# Patient Record
Sex: FEMALE | Employment: FULL TIME | URBAN - METROPOLITAN AREA
[De-identification: names, ages, dates, MRNs, and addresses within clinical notes are randomized per-mention and may not be internally consistent; named-entity substitution may affect disease eponyms.]

---

## 2023-10-02 ENCOUNTER — TELEPHONE (OUTPATIENT)
Dept: OBSTETRICS AND GYNECOLOGY | Facility: CLINIC | Age: 48
End: 2023-10-02
Payer: COMMERCIAL

## 2023-10-02 NOTE — TELEPHONE ENCOUNTER
"Patient said she used the estradiol cream twice and her period came back would like to speak to a nurse to see if this is normal.    F/U call made to pt. She was seen 9/18/23. Pt LMP was approx Jan/Feb 2023. She said that she used her estradiol cream twice and a few days later she began to have \"period like bleeding\". Pt said that she has been using the estradiol cream with the applicator and inserting vaginally. Reviewed with pt that it could be agitating the vaginal lining and causing bleeding or that it is coincidental and she got a period and is not yet in menopause. Advised pt that the instructions for her use are to apply a pea sized amt to the vaginal opening 2x/w. Pt stated understanding and will monitor sx. She will CB PRN. And to schedule her urodynamic appt.   "

## 2023-10-20 PROBLEM — N95.2 VAGINAL ATROPHY: Status: ACTIVE | Noted: 2023-10-20

## 2023-10-20 PROBLEM — D50.9 IRON DEFICIENCY ANEMIA: Status: ACTIVE | Noted: 2023-10-20

## 2023-10-20 PROBLEM — N39.3 STRESS INCONTINENCE, FEMALE: Status: ACTIVE | Noted: 2023-10-20

## 2023-10-20 PROBLEM — Z86.19 HISTORY OF COLD SORES: Status: ACTIVE | Noted: 2023-10-20

## 2023-10-20 RX ORDER — MULTIVIT-MIN/IRON/FOLIC ACID/K 18-600-40
CAPSULE ORAL
COMMUNITY
End: 2023-10-23 | Stop reason: ALTCHOICE

## 2023-10-20 RX ORDER — VALACYCLOVIR HYDROCHLORIDE 1 G/1
TABLET, FILM COATED ORAL 2 TIMES DAILY
COMMUNITY
Start: 2022-03-17 | End: 2024-05-09 | Stop reason: DRUGHIGH

## 2023-10-20 RX ORDER — POLYETHYLENE GLYCOL 3350, SODIUM CHLORIDE, SODIUM BICARBONATE, POTASSIUM CHLORIDE 420; 11.2; 5.72; 1.48 G/4L; G/4L; G/4L; G/4L
POWDER, FOR SOLUTION ORAL
COMMUNITY
Start: 2022-01-31 | End: 2023-10-23 | Stop reason: ALTCHOICE

## 2023-10-20 RX ORDER — ASCORBIC ACID 250 MG
250 TABLET ORAL DAILY
COMMUNITY

## 2023-10-20 RX ORDER — ESTRADIOL 0.1 MG/G
2 CREAM VAGINAL EVERY OTHER DAY
COMMUNITY
Start: 2023-09-18

## 2023-10-20 RX ORDER — CYCLOSPORINE 0.5 MG/ML
EMULSION OPHTHALMIC
COMMUNITY
Start: 2020-11-13

## 2023-10-20 RX ORDER — FERROUS SULFATE 325(65) MG
1 TABLET ORAL DAILY
COMMUNITY
Start: 2021-11-05 | End: 2023-10-23 | Stop reason: ALTCHOICE

## 2023-10-20 RX ORDER — CHOLECALCIFEROL (VITAMIN D3) 125 MCG
3000 CAPSULE ORAL
COMMUNITY

## 2023-10-23 ENCOUNTER — OFFICE VISIT (OUTPATIENT)
Dept: OBSTETRICS AND GYNECOLOGY | Facility: CLINIC | Age: 48
End: 2023-10-23
Payer: COMMERCIAL

## 2023-10-23 ENCOUNTER — PREP FOR PROCEDURE (OUTPATIENT)
Dept: OBSTETRICS AND GYNECOLOGY | Facility: CLINIC | Age: 48
End: 2023-10-23

## 2023-10-23 VITALS
WEIGHT: 112 LBS | DIASTOLIC BLOOD PRESSURE: 74 MMHG | HEIGHT: 61 IN | BODY MASS INDEX: 21.14 KG/M2 | SYSTOLIC BLOOD PRESSURE: 114 MMHG

## 2023-10-23 DIAGNOSIS — N39.3 SUI (STRESS URINARY INCONTINENCE, FEMALE): Primary | ICD-10-CM

## 2023-10-23 DIAGNOSIS — N32.81 OAB (OVERACTIVE BLADDER): ICD-10-CM

## 2023-10-23 PROCEDURE — 1036F TOBACCO NON-USER: CPT | Performed by: STUDENT IN AN ORGANIZED HEALTH CARE EDUCATION/TRAINING PROGRAM

## 2023-10-23 PROCEDURE — 99214 OFFICE O/P EST MOD 30 MIN: CPT | Performed by: STUDENT IN AN ORGANIZED HEALTH CARE EDUCATION/TRAINING PROGRAM

## 2023-10-23 RX ORDER — SODIUM CHLORIDE, SODIUM LACTATE, POTASSIUM CHLORIDE, CALCIUM CHLORIDE 600; 310; 30; 20 MG/100ML; MG/100ML; MG/100ML; MG/100ML
100 INJECTION, SOLUTION INTRAVENOUS CONTINUOUS
Status: CANCELLED | OUTPATIENT
Start: 2023-12-28

## 2023-10-23 RX ORDER — PHENAZOPYRIDINE HYDROCHLORIDE 200 MG/1
200 TABLET, FILM COATED ORAL ONCE
Status: CANCELLED | OUTPATIENT
Start: 2023-10-23 | End: 2023-10-23

## 2023-10-23 ASSESSMENT — PAIN SCALES - GENERAL: PAINLEVEL: 0-NO PAIN

## 2023-10-23 NOTE — PROGRESS NOTES
HISTORY OF PRESENT ILLNESS:  Nancy Andres is a 47 y.o. female, who presents in follow up for CORTEZ, vaginal atrophy     During our last encounter on 9/18/23 we reviewed and agreed to the following:  - considering bulking and/or PFPT; needs UDS prior to surgery  - rec estrogen cream for vaginal atrophy (rx sent)  - OAB less bothersome, opting to focus on CORTEZ    Today she reports that she has CORTEZ episodes during physical activity. Pt states that she would like to go ahead with the UDS and midurethral sling for her CORTEZ.   Pt reports improvement in vaginal dryness and dyspareunia after using the estrogen cream twice.   Pt states her nocturnal OAB is less concerning, and she wakes up once per night due to symptoms.     IMPRESSION AND PLAN:  46 y/o with CORTEZ, OAB. Interested in surgical management.    CROTEZ  - Reviewed options for surgical management of CORTEZ: midurethral sling versus periurethral bulking injections and steps of each procedure  - Previously counseled on success rates of each: sling ~95% successful and bulkamid 60-70% success  - Interested in pursuing sling surgery  - Reviewed risk of urinary retention requiring indwelling catheter temporarily which would be removed in the office following surgery  - reviewed recovery expectations: 2 weeks no heavy lifting, no driving for first few days and how to know she is ok to resume driving  - Will need urodynamics prior to surgery? Yes, scheduling prior to leaving the office today  - Does not need PCP/PAT clearance prior to OR  - surgery consent signed today in office and OR reservation request made today  - motivated to have surgery before end of the year, discussed my maternity leave in 2024  - ok with [place] or [facility]    OAB  - not bothersome  - deferring management at this time    All questions and concerns were answered and addressed.  The patient expressed understanding and agrees with the plan.     Leonora Mena MD

## 2023-10-23 NOTE — PROGRESS NOTES
"HISTORY OF PRESENT ILLNESS:  Nancy Andres is a 47 y.o. female, who presents in follow up for CORTEZ, vaginal atrophy     During our last encounter on 9/18/23 we reviewed and agreed to the following:  - considering bulking and/or PFPT; needs UDS prior to surgery  - rec estrogen cream for vaginal atrophy (rx sent)  - OAB less bothersome, opting to focus on CORTEZ    Today she reports   ***    The following were reviewed to gain additional history:  External notes: ***  Test results: ***          PHYSICAL EXAMINATION:  Patient's last menstrual period was 09/18/2023.  Body mass index is 21.16 kg/m².  /74   Ht 1.549 m (5' 1\")   Wt 50.8 kg (112 lb)   LMP 09/18/2023   BMI 21.16 kg/m²     General Appearance: well appearing  Neuro: Alert and oriented   HEENT: mucous membranes moist, neck supple  Resp: No respiratory distress, normal work of breathing  MSK: normal range of motion, gait appropriate    Pelvic:  Chaperone for pelvic exam:   Genitourinary: *** normal external genitalia, Bartholin's glands, Brook[place]'s glands negative,   Urethra ***normal meatus, non-tender, no periurethral mass  Vaginal mucosa *** normal  Cervix *** normal  Uterus ***normal size, nontender  Adnexae *** negative nontender, no masses  Atrophy {POSITIVE/NEGATIVE:18270}    CST {POSITIVE/NEGATIVE:20240}  Pelvic floor muscle contraction  ***/5    POP-Q (in supine position):       Aa ***     Ba ***     C ***              gh ***     pb ***     tvl ***              Ap ***     Bp ***     D ***    Rectal: no hemorrhoids, fissures or masses.    PVR (by ultrasound): ***  Urine dip:  No results found for this or any previous visit.       IMPRESSION AND PLAN:  ***    All questions and concerns were answered and addressed.  The patient expressed understanding and agrees with the plan.     "

## 2023-10-25 PROBLEM — N39.3 SUI (STRESS URINARY INCONTINENCE, FEMALE): Status: ACTIVE | Noted: 2023-10-23

## 2023-10-26 ENCOUNTER — TELEPHONE (OUTPATIENT)
Dept: OBSTETRICS AND GYNECOLOGY | Facility: CLINIC | Age: 48
End: 2023-10-26
Payer: COMMERCIAL

## 2023-10-26 NOTE — TELEPHONE ENCOUNTER
I spoke with patient on 10/25/23 to schedule her surgery with Dr Mena at [facility]. She is now scheduled for 12/28/2023 for a mid-urethral sling and cystoscopy.

## 2023-10-30 ENCOUNTER — PROCEDURE VISIT (OUTPATIENT)
Dept: OBSTETRICS AND GYNECOLOGY | Facility: CLINIC | Age: 48
End: 2023-10-30
Payer: COMMERCIAL

## 2023-10-30 DIAGNOSIS — N39.3 SUI (STRESS URINARY INCONTINENCE, FEMALE): Primary | ICD-10-CM

## 2023-10-30 PROCEDURE — 51741 ELECTRO-UROFLOWMETRY FIRST: CPT | Performed by: STUDENT IN AN ORGANIZED HEALTH CARE EDUCATION/TRAINING PROGRAM

## 2023-10-30 PROCEDURE — 51797 INTRAABDOMINAL PRESSURE TEST: CPT | Performed by: STUDENT IN AN ORGANIZED HEALTH CARE EDUCATION/TRAINING PROGRAM

## 2023-10-30 PROCEDURE — 51729 CYSTOMETROGRAM W/VP&UP: CPT | Performed by: STUDENT IN AN ORGANIZED HEALTH CARE EDUCATION/TRAINING PROGRAM

## 2023-10-30 PROCEDURE — 51784 ANAL/URINARY MUSCLE STUDY: CPT | Performed by: STUDENT IN AN ORGANIZED HEALTH CARE EDUCATION/TRAINING PROGRAM

## 2023-10-31 ENCOUNTER — TELEMEDICINE (OUTPATIENT)
Dept: OBSTETRICS AND GYNECOLOGY | Facility: CLINIC | Age: 48
End: 2023-10-31
Payer: COMMERCIAL

## 2023-10-31 VITALS — WEIGHT: 112 LBS | BODY MASS INDEX: 21.99 KG/M2 | HEIGHT: 60 IN

## 2023-10-31 DIAGNOSIS — N39.3 SUI (STRESS URINARY INCONTINENCE, FEMALE): Primary | ICD-10-CM

## 2023-10-31 PROCEDURE — 51729 CYSTOMETROGRAM W/VP&UP: CPT | Performed by: STUDENT IN AN ORGANIZED HEALTH CARE EDUCATION/TRAINING PROGRAM

## 2023-10-31 PROCEDURE — 51784 ANAL/URINARY MUSCLE STUDY: CPT | Performed by: STUDENT IN AN ORGANIZED HEALTH CARE EDUCATION/TRAINING PROGRAM

## 2023-10-31 PROCEDURE — 51798 US URINE CAPACITY MEASURE: CPT | Performed by: STUDENT IN AN ORGANIZED HEALTH CARE EDUCATION/TRAINING PROGRAM

## 2023-10-31 PROCEDURE — 51741 ELECTRO-UROFLOWMETRY FIRST: CPT | Performed by: STUDENT IN AN ORGANIZED HEALTH CARE EDUCATION/TRAINING PROGRAM

## 2023-10-31 PROCEDURE — 99213 OFFICE O/P EST LOW 20 MIN: CPT | Performed by: STUDENT IN AN ORGANIZED HEALTH CARE EDUCATION/TRAINING PROGRAM

## 2023-10-31 PROCEDURE — 51797 INTRAABDOMINAL PRESSURE TEST: CPT | Performed by: STUDENT IN AN ORGANIZED HEALTH CARE EDUCATION/TRAINING PROGRAM

## 2023-10-31 ASSESSMENT — PAIN SCALES - GENERAL: PAINLEVEL: 0-NO PAIN

## 2023-10-31 NOTE — PROGRESS NOTES
Patient ID: Nnacy Andres is a 47 y.o. female.    Uroflowmetry    Date/Time: 10/31/2023 9:15 AM    Performed by: Sana Stanley MA  Authorized by: Leonora Mena MD    Procedure Details     Procedure: CMG with UPP/voiding pressures, EMG, intra-abdominal voiding study and uroflow      CMG with UPP/Voiding Pressures Details:     First urge to void (mL): 195    Urgency to void (mL): 241    Bladder capacity (mL): 301    Intra-abdominal Voiding Study Details:     Rectal catheter placed to record intra-abdominal pressure: yes      Uroflow Details:     Pre-void volume (mL): 52.7    Voiding duration (sec): 9.4    Average flow rate (mL/sec): 5.6    Max flow rate (mL/sec): 8.8    Type of curve: bell      Voided urine (mL): 329    Residual urine (mL): 0    Post-Procedure Details     Outcome: patient tolerated procedure well with no complications      Additional Details      + stress incontinence

## 2023-10-31 NOTE — PROGRESS NOTES
Telemedicine Visit - Urogynecology    A telephone visit (audio only) between the patient (at the originating site) and provider (at the distant site) was utilized to provide this telehealth service  Verbal consent was obtained from Nancy Andres on this date 10/31/23 for a telehealth visit    HISTORY OF PRESENT ILLNESS:  Nancy Andres is a 47 y.o. female, here today for a virtual visit in follow up for UDS results     During our last encounter on 10/23/23 we reviewed and agreed to the followin y/o with CORTEZ, OAB. Interested in surgical management.     CORTEZ  - Reviewed options for surgical management of CORTEZ: midurethral sling versus periurethral bulking injections and steps of each procedure  - Previously counseled on success rates of each: sling ~95% successful and bulkamid 60-70% success  - Interested in pursuing sling surgery  - Reviewed risk of urinary retention requiring indwelling catheter temporarily which would be removed in the office following surgery  - reviewed recovery expectations: 2 weeks no heavy lifting, no driving for first few days and how to know she is ok to resume driving  - Will need urodynamics prior to surgery? Yes, scheduling prior to leaving the office today  - Does not need PCP/PAT clearance prior to OR  - surgery consent signed today in office and OR reservation request made today  - motivated to have surgery before end of the year, discussed my maternity leave in   - ok with [place] or [facility]    OAB  - not bothersome  - deferring management at this time     All questions and concerns were answered and addressed.  The patient expressed understanding and agrees with the plan.    Today she reports   Ready for surgery, scheduled for . Asking about post op appointments and duration of surgery.    The following were reviewed to gain additional history:  Test results: UDS reviewed (see read interpretation from this encounter)      IMPRESSION AND PLAN:  48 y/o with CORTEZ,  virtual visit to review UDS results    CORTEZ  - confirmed on UDS  - appropriate candidate for sling as planned  - will plan to schedule post op appointment with me for 2 weeks post op, Shena Canales CNP will be back up plan. She is amenable to this plan.  - all questions answered    Follow up for surgery 12/28/23 as scheduled    All questions and concerns were answered and addressed.  The patient expressed understanding and agrees with the plan.     Leonora Mena MD

## 2023-10-31 NOTE — PROGRESS NOTES
Urodynamics Results    Uroflowmetry:   Maximum Flow Rate: 8.8 ml/s   Average Flow: 5.6 ml/s   Volume Voided: 52.7 ml   Post void residual: 5 ml    Cystometry:   First desire: 195 ml   P detrusor: -3 cm H2O   Strong desire: 239  ml   P detrusor: -6 cm H2O  Bladder Capacity: 301 ml.   End filling detrusor pressure: -5 H2O   Bladder sensation: normal    Detrusor activity: negative DO    Compliance: normal  CORTEZ: cough leak at 241  Urethral pressure profile (UPP): fair  Maximum urethral closure pressure: 59 H2O    Voiding Study:   Maximum flow: 23.1 ml/s   Average flow: 13.4 ml/s   P detrusor at peak flow: 43.6 cm H2O (likely falsely elevated, Pabd cath to zero)  Volume voided: 329.3 ml   Pattern:  bell curve   Mechanism of voiding: detrusor contraction  Detrusor contraction with void: good    UDS INTERPRETATION    Uroflow: low voided volume, appropriate flow pattern, PVR 5 ml    CMG: normal compliance, normal sensation, negative DO, + CORTEZ at 240 ml    Voiding: normal flow pattern, normal detrusor pressure, normal voiding mechanism    SUMMARY:  +CORTEZ, otherwise unremarkable UDS  Appropriate candidate for sling as scheduled 12/28/23    Leonora Mena MD

## 2023-12-28 ENCOUNTER — ANESTHESIA EVENT (OUTPATIENT)
Dept: OPERATING ROOM | Facility: HOSPITAL | Age: 48
End: 2023-12-28
Payer: COMMERCIAL

## 2023-12-28 ENCOUNTER — HOSPITAL ENCOUNTER (OUTPATIENT)
Facility: HOSPITAL | Age: 48
Setting detail: OUTPATIENT SURGERY
Discharge: HOME | End: 2023-12-28
Attending: STUDENT IN AN ORGANIZED HEALTH CARE EDUCATION/TRAINING PROGRAM | Admitting: STUDENT IN AN ORGANIZED HEALTH CARE EDUCATION/TRAINING PROGRAM
Payer: COMMERCIAL

## 2023-12-28 ENCOUNTER — ANESTHESIA (OUTPATIENT)
Dept: OPERATING ROOM | Facility: HOSPITAL | Age: 48
End: 2023-12-28
Payer: COMMERCIAL

## 2023-12-28 VITALS
BODY MASS INDEX: 21.6 KG/M2 | HEIGHT: 60 IN | WEIGHT: 110 LBS | RESPIRATION RATE: 18 BRPM | TEMPERATURE: 98.6 F | DIASTOLIC BLOOD PRESSURE: 61 MMHG | SYSTOLIC BLOOD PRESSURE: 119 MMHG | OXYGEN SATURATION: 100 % | HEART RATE: 71 BPM

## 2023-12-28 DIAGNOSIS — N39.3 SUI (STRESS URINARY INCONTINENCE, FEMALE): Primary | ICD-10-CM

## 2023-12-28 LAB — HCG UR QL IA.RAPID: NEGATIVE

## 2023-12-28 PROCEDURE — 2500000004 HC RX 250 GENERAL PHARMACY W/ HCPCS (ALT 636 FOR OP/ED): Performed by: STUDENT IN AN ORGANIZED HEALTH CARE EDUCATION/TRAINING PROGRAM

## 2023-12-28 PROCEDURE — 2500000005 HC RX 250 GENERAL PHARMACY W/O HCPCS: Performed by: NURSE ANESTHETIST, CERTIFIED REGISTERED

## 2023-12-28 PROCEDURE — 81025 URINE PREGNANCY TEST: CPT | Performed by: STUDENT IN AN ORGANIZED HEALTH CARE EDUCATION/TRAINING PROGRAM

## 2023-12-28 PROCEDURE — 2780000003 HC OR 278 NO HCPCS: Performed by: STUDENT IN AN ORGANIZED HEALTH CARE EDUCATION/TRAINING PROGRAM

## 2023-12-28 PROCEDURE — A57288 PR SLING OPER STRES INCONTINENCE: Performed by: NURSE ANESTHETIST, CERTIFIED REGISTERED

## 2023-12-28 PROCEDURE — 3700000001 HC GENERAL ANESTHESIA TIME - INITIAL BASE CHARGE: Performed by: STUDENT IN AN ORGANIZED HEALTH CARE EDUCATION/TRAINING PROGRAM

## 2023-12-28 PROCEDURE — A57288 PR SLING OPER STRES INCONTINENCE: Performed by: STUDENT IN AN ORGANIZED HEALTH CARE EDUCATION/TRAINING PROGRAM

## 2023-12-28 PROCEDURE — 3600000008 HC OR TIME - EACH INCREMENTAL 1 MINUTE - PROCEDURE LEVEL THREE: Performed by: STUDENT IN AN ORGANIZED HEALTH CARE EDUCATION/TRAINING PROGRAM

## 2023-12-28 PROCEDURE — 2500000001 HC RX 250 WO HCPCS SELF ADMINISTERED DRUGS (ALT 637 FOR MEDICARE OP): Performed by: STUDENT IN AN ORGANIZED HEALTH CARE EDUCATION/TRAINING PROGRAM

## 2023-12-28 PROCEDURE — C1771 REP DEV, URINARY, W/SLING: HCPCS | Performed by: STUDENT IN AN ORGANIZED HEALTH CARE EDUCATION/TRAINING PROGRAM

## 2023-12-28 PROCEDURE — 7100000010 HC PHASE TWO TIME - EACH INCREMENTAL 1 MINUTE: Performed by: STUDENT IN AN ORGANIZED HEALTH CARE EDUCATION/TRAINING PROGRAM

## 2023-12-28 PROCEDURE — 57288 REPAIR BLADDER DEFECT: CPT | Performed by: STUDENT IN AN ORGANIZED HEALTH CARE EDUCATION/TRAINING PROGRAM

## 2023-12-28 PROCEDURE — 7100000001 HC RECOVERY ROOM TIME - INITIAL BASE CHARGE: Performed by: STUDENT IN AN ORGANIZED HEALTH CARE EDUCATION/TRAINING PROGRAM

## 2023-12-28 PROCEDURE — 3700000002 HC GENERAL ANESTHESIA TIME - EACH INCREMENTAL 1 MINUTE: Performed by: STUDENT IN AN ORGANIZED HEALTH CARE EDUCATION/TRAINING PROGRAM

## 2023-12-28 PROCEDURE — 7100000009 HC PHASE TWO TIME - INITIAL BASE CHARGE: Performed by: STUDENT IN AN ORGANIZED HEALTH CARE EDUCATION/TRAINING PROGRAM

## 2023-12-28 PROCEDURE — 7100000002 HC RECOVERY ROOM TIME - EACH INCREMENTAL 1 MINUTE: Performed by: STUDENT IN AN ORGANIZED HEALTH CARE EDUCATION/TRAINING PROGRAM

## 2023-12-28 PROCEDURE — 2500000004 HC RX 250 GENERAL PHARMACY W/ HCPCS (ALT 636 FOR OP/ED): Performed by: NURSE ANESTHETIST, CERTIFIED REGISTERED

## 2023-12-28 PROCEDURE — 3600000003 HC OR TIME - INITIAL BASE CHARGE - PROCEDURE LEVEL THREE: Performed by: STUDENT IN AN ORGANIZED HEALTH CARE EDUCATION/TRAINING PROGRAM

## 2023-12-28 RX ORDER — DEXAMETHASONE SODIUM PHOSPHATE 4 MG/ML
INJECTION, SOLUTION INTRA-ARTICULAR; INTRALESIONAL; INTRAMUSCULAR; INTRAVENOUS; SOFT TISSUE AS NEEDED
Status: DISCONTINUED | OUTPATIENT
Start: 2023-12-28 | End: 2023-12-28

## 2023-12-28 RX ORDER — OXYCODONE HYDROCHLORIDE 5 MG/1
5 TABLET ORAL EVERY 6 HOURS PRN
Qty: 12 TABLET | Refills: 0 | Status: SHIPPED | OUTPATIENT
Start: 2023-12-28 | End: 2023-12-31

## 2023-12-28 RX ORDER — OXYCODONE HYDROCHLORIDE 5 MG/1
5 TABLET ORAL EVERY 4 HOURS PRN
Status: DISCONTINUED | OUTPATIENT
Start: 2023-12-28 | End: 2023-12-28 | Stop reason: HOSPADM

## 2023-12-28 RX ORDER — PHENAZOPYRIDINE HYDROCHLORIDE 100 MG/1
200 TABLET, FILM COATED ORAL ONCE
Status: COMPLETED | OUTPATIENT
Start: 2023-12-28 | End: 2023-12-28

## 2023-12-28 RX ORDER — KETOROLAC TROMETHAMINE 30 MG/ML
INJECTION, SOLUTION INTRAMUSCULAR; INTRAVENOUS AS NEEDED
Status: DISCONTINUED | OUTPATIENT
Start: 2023-12-28 | End: 2023-12-28

## 2023-12-28 RX ORDER — LABETALOL HYDROCHLORIDE 5 MG/ML
5 INJECTION, SOLUTION INTRAVENOUS ONCE AS NEEDED
Status: DISCONTINUED | OUTPATIENT
Start: 2023-12-28 | End: 2023-12-28 | Stop reason: HOSPADM

## 2023-12-28 RX ORDER — LIDOCAINE HYDROCHLORIDE 20 MG/ML
INJECTION, SOLUTION EPIDURAL; INFILTRATION; INTRACAUDAL; PERINEURAL AS NEEDED
Status: DISCONTINUED | OUTPATIENT
Start: 2023-12-28 | End: 2023-12-28

## 2023-12-28 RX ORDER — POLYETHYLENE GLYCOL 3350 17 G/17G
17 POWDER, FOR SOLUTION ORAL DAILY
Qty: 510 G | Refills: 0 | Status: SHIPPED | OUTPATIENT
Start: 2023-12-28 | End: 2024-01-27

## 2023-12-28 RX ORDER — LIDOCAINE HYDROCHLORIDE 10 MG/ML
0.1 INJECTION, SOLUTION EPIDURAL; INFILTRATION; INTRACAUDAL; PERINEURAL ONCE
Status: DISCONTINUED | OUTPATIENT
Start: 2023-12-28 | End: 2023-12-28 | Stop reason: HOSPADM

## 2023-12-28 RX ORDER — MIDAZOLAM HYDROCHLORIDE 1 MG/ML
INJECTION, SOLUTION INTRAMUSCULAR; INTRAVENOUS AS NEEDED
Status: DISCONTINUED | OUTPATIENT
Start: 2023-12-28 | End: 2023-12-28

## 2023-12-28 RX ORDER — ACETAMINOPHEN 325 MG/1
975 TABLET ORAL ONCE
Status: COMPLETED | OUTPATIENT
Start: 2023-12-28 | End: 2023-12-28

## 2023-12-28 RX ORDER — ROCURONIUM BROMIDE 50 MG/5 ML
SYRINGE (ML) INTRAVENOUS AS NEEDED
Status: DISCONTINUED | OUTPATIENT
Start: 2023-12-28 | End: 2023-12-28

## 2023-12-28 RX ORDER — SODIUM CHLORIDE, SODIUM LACTATE, POTASSIUM CHLORIDE, CALCIUM CHLORIDE 600; 310; 30; 20 MG/100ML; MG/100ML; MG/100ML; MG/100ML
100 INJECTION, SOLUTION INTRAVENOUS CONTINUOUS
Status: DISCONTINUED | OUTPATIENT
Start: 2023-12-28 | End: 2023-12-28 | Stop reason: HOSPADM

## 2023-12-28 RX ORDER — FENTANYL CITRATE 50 UG/ML
INJECTION, SOLUTION INTRAMUSCULAR; INTRAVENOUS AS NEEDED
Status: DISCONTINUED | OUTPATIENT
Start: 2023-12-28 | End: 2023-12-28

## 2023-12-28 RX ORDER — CEFAZOLIN SODIUM 2 G/100ML
2 INJECTION, SOLUTION INTRAVENOUS ONCE
Status: COMPLETED | OUTPATIENT
Start: 2023-12-28 | End: 2023-12-28

## 2023-12-28 RX ORDER — FENTANYL CITRATE 50 UG/ML
25 INJECTION, SOLUTION INTRAMUSCULAR; INTRAVENOUS EVERY 5 MIN PRN
Status: DISCONTINUED | OUTPATIENT
Start: 2023-12-28 | End: 2023-12-28 | Stop reason: HOSPADM

## 2023-12-28 RX ORDER — HYDROMORPHONE HYDROCHLORIDE 1 MG/ML
0.5 INJECTION, SOLUTION INTRAMUSCULAR; INTRAVENOUS; SUBCUTANEOUS EVERY 5 MIN PRN
Status: DISCONTINUED | OUTPATIENT
Start: 2023-12-28 | End: 2023-12-28 | Stop reason: HOSPADM

## 2023-12-28 RX ORDER — PROPOFOL 10 MG/ML
INJECTION, EMULSION INTRAVENOUS AS NEEDED
Status: DISCONTINUED | OUTPATIENT
Start: 2023-12-28 | End: 2023-12-28

## 2023-12-28 RX ORDER — ALBUTEROL SULFATE 0.83 MG/ML
2.5 SOLUTION RESPIRATORY (INHALATION) ONCE AS NEEDED
Status: DISCONTINUED | OUTPATIENT
Start: 2023-12-28 | End: 2023-12-28 | Stop reason: HOSPADM

## 2023-12-28 RX ORDER — ONDANSETRON HYDROCHLORIDE 2 MG/ML
INJECTION, SOLUTION INTRAVENOUS AS NEEDED
Status: DISCONTINUED | OUTPATIENT
Start: 2023-12-28 | End: 2023-12-28

## 2023-12-28 RX ORDER — VASOPRESSIN 20 U/ML
INJECTION PARENTERAL CONTINUOUS PRN
Status: COMPLETED | OUTPATIENT
Start: 2023-12-28 | End: 2023-12-28

## 2023-12-28 RX ORDER — ONDANSETRON HYDROCHLORIDE 2 MG/ML
4 INJECTION, SOLUTION INTRAVENOUS ONCE AS NEEDED
Status: DISCONTINUED | OUTPATIENT
Start: 2023-12-28 | End: 2023-12-28 | Stop reason: HOSPADM

## 2023-12-28 RX ORDER — MEPERIDINE HYDROCHLORIDE 50 MG/ML
12.5 INJECTION INTRAMUSCULAR; INTRAVENOUS; SUBCUTANEOUS EVERY 10 MIN PRN
Status: DISCONTINUED | OUTPATIENT
Start: 2023-12-28 | End: 2023-12-28 | Stop reason: HOSPADM

## 2023-12-28 RX ORDER — CELECOXIB 200 MG/1
200 CAPSULE ORAL ONCE
Status: COMPLETED | OUTPATIENT
Start: 2023-12-28 | End: 2023-12-28

## 2023-12-28 RX ADMIN — FENTANYL CITRATE 25 MCG: 50 INJECTION, SOLUTION INTRAMUSCULAR; INTRAVENOUS at 08:05

## 2023-12-28 RX ADMIN — PHENAZOPYRIDINE 200 MG: 100 TABLET ORAL at 07:23

## 2023-12-28 RX ADMIN — ONDANSETRON 4 MG: 2 INJECTION, SOLUTION INTRAMUSCULAR; INTRAVENOUS at 08:42

## 2023-12-28 RX ADMIN — SUGAMMADEX 200 MG: 100 INJECTION, SOLUTION INTRAVENOUS at 09:01

## 2023-12-28 RX ADMIN — CELECOXIB 200 MG: 200 CAPSULE ORAL at 07:24

## 2023-12-28 RX ADMIN — FENTANYL CITRATE 25 MCG: 50 INJECTION, SOLUTION INTRAMUSCULAR; INTRAVENOUS at 09:37

## 2023-12-28 RX ADMIN — DEXAMETHASONE SODIUM PHOSPHATE 4 MG: 4 INJECTION INTRA-ARTICULAR; INTRALESIONAL; INTRAMUSCULAR; INTRAVENOUS; SOFT TISSUE at 07:55

## 2023-12-28 RX ADMIN — Medication 50 MG: at 07:45

## 2023-12-28 RX ADMIN — MIDAZOLAM 2 MG: 1 INJECTION INTRAMUSCULAR; INTRAVENOUS at 07:38

## 2023-12-28 RX ADMIN — PROPOFOL 200 MG: 10 INJECTION, EMULSION INTRAVENOUS at 07:45

## 2023-12-28 RX ADMIN — FENTANYL CITRATE 50 MCG: 50 INJECTION, SOLUTION INTRAMUSCULAR; INTRAVENOUS at 07:45

## 2023-12-28 RX ADMIN — ACETAMINOPHEN 975 MG: 325 TABLET ORAL at 07:23

## 2023-12-28 RX ADMIN — SODIUM CHLORIDE, SODIUM LACTATE, POTASSIUM CHLORIDE, AND CALCIUM CHLORIDE: 600; 310; 30; 20 INJECTION, SOLUTION INTRAVENOUS at 07:38

## 2023-12-28 RX ADMIN — CEFAZOLIN SODIUM 2 G: 2 INJECTION, SOLUTION INTRAVENOUS at 07:51

## 2023-12-28 RX ADMIN — KETOROLAC TROMETHAMINE 30 MG: 30 INJECTION, SOLUTION INTRAMUSCULAR; INTRAVENOUS at 08:57

## 2023-12-28 RX ADMIN — FENTANYL CITRATE 25 MCG: 50 INJECTION, SOLUTION INTRAMUSCULAR; INTRAVENOUS at 08:42

## 2023-12-28 RX ADMIN — LIDOCAINE HYDROCHLORIDE 100 MG: 20 INJECTION, SOLUTION EPIDURAL; INFILTRATION; INTRACAUDAL; PERINEURAL at 07:45

## 2023-12-28 SDOH — HEALTH STABILITY: MENTAL HEALTH: CURRENT SMOKER: 0

## 2023-12-28 ASSESSMENT — PAIN - FUNCTIONAL ASSESSMENT
PAIN_FUNCTIONAL_ASSESSMENT: 0-10

## 2023-12-28 ASSESSMENT — COLUMBIA-SUICIDE SEVERITY RATING SCALE - C-SSRS
6. HAVE YOU EVER DONE ANYTHING, STARTED TO DO ANYTHING, OR PREPARED TO DO ANYTHING TO END YOUR LIFE?: NO
2. HAVE YOU ACTUALLY HAD ANY THOUGHTS OF KILLING YOURSELF?: NO
1. IN THE PAST MONTH, HAVE YOU WISHED YOU WERE DEAD OR WISHED YOU COULD GO TO SLEEP AND NOT WAKE UP?: NO

## 2023-12-28 ASSESSMENT — PAIN SCALES - GENERAL
PAINLEVEL_OUTOF10: 1
PAINLEVEL_OUTOF10: 4
PAINLEVEL_OUTOF10: 2
PAIN_LEVEL: 0
PAINLEVEL_OUTOF10: 0 - NO PAIN
PAINLEVEL_OUTOF10: 2

## 2023-12-28 ASSESSMENT — PAIN DESCRIPTION - DESCRIPTORS
DESCRIPTORS: ACHING;DULL;SORE
DESCRIPTORS: ACHING;SHARP;SORE

## 2023-12-28 NOTE — DISCHARGE INSTRUCTIONS
Call Dr. Mena's office for any problems and/or concerns    *Walk as much as possible, it is ok to use stairs carefully  *Ok to shower, avoid soaking in tub baths or swimming for 4-6 weeks after surgery   *Continue the coughing and deep breathing exercises that your learned in the hospital  *No lifting/straining and avoid constipation for 4-6 weeks (Avoid strenuous activity)  *Prevent constipation by using stool softeners and staying hydrated, so that you do not strain against your stitches or have pain from constipation  *Vaginal rest for 6 weeks (Do not put anything in your vagina except prescribed vaginal estrogen. Do not use tampons or douches. Do not have sex until cleared by physician)    Call Doctor right away for:    *Fever above 100.4/shaking chills  *Bright red vaginal bleeding or bleeding that soaks more than one sanitary pad per hour  *A foul smelling discharge from the vagina  *Trouble urinating or burning with urination  *Severe pain or bloating in your abdomen  *Persistent nausea/vomiting  *Redness, swelling, or drainage at your incision sites  *Chest pain/shortness of breath-call 911.    - You will be going home with prescriptions for pain medication. If you are able to take them, alternate a dose of Acetaminophen (Tylenol) and Ibuprofen (Motrin) every 3 hours. (For example, 1000mg of Tylenol at 09:00am, 600mg ibuprofen at 12:00pm, 1000mg of Tylenol at 3:00pm, 600mg ibuprofen at 6:00pm).   - Use any prescribed narcotic (ie oxycodone) for breakthrough pain as prescribed.   - Use a stool softener, such as Miralax, to prevent constipation from the narcotic medication.   - If you are going home with or already have a prescription for estrogen cream, you may begin/resume use vaginally as prescribed nightly until your 2 week post-op visit.

## 2023-12-28 NOTE — H&P
History Of Present Illness  Nancy Andres is a 48 y.o. female presenting with stress urinary  incontinence.     Past Medical History  No past medical history on file.    Surgical History  Past Surgical History:   Procedure Laterality Date    IR ANGIOGRAM INFERIOR EPIGASTRIC  2020    IR ANGIOGRAM INFERIOR EPIGASTRIC 2020 [facility]    IR ANGIOGRAM INFERIOR EPIGASTRIC  2020    IR ANGIOGRAM INFERIOR EPIGASTRIC 2020 [facility]    IR ANGIOGRAM INFERIOR EPIGASTRIC PELVIC  2020    IR ANGIOGRAM INFERIOR EPIGASTRIC PELVIC 2020 [facility]    OTHER SURGICAL HISTORY  2021    Uterine nerve ablation    OTHER SURGICAL HISTORY  2021     section    OTHER SURGICAL HISTORY  2021    Corneal lasik        Social History  She reports that she has never smoked. She has never used smokeless tobacco. She reports that she does not drink alcohol and does not use drugs.    Family History  Family History   Problem Relation Name Age of Onset    Breast cancer Mother      Other (thyroid dysfunction) Mother      Hypertension Father          Allergies  Gadoterate meglumine and Iodinated contrast media    Review of Systems   Genitourinary:         Urinary incontinence   All other systems reviewed and are negative.       Physical Exam  Vitals reviewed.   Constitutional:       Appearance: Normal appearance.   HENT:      Head: Normocephalic and atraumatic.   Cardiovascular:      Rate and Rhythm: Normal rate.   Pulmonary:      Effort: Pulmonary effort is normal.   Abdominal:      General: Abdomen is flat.      Palpations: Abdomen is soft.   Musculoskeletal:      Right lower leg: No edema.      Left lower leg: No edema.   Skin:     General: Skin is warm and dry.   Neurological:      Mental Status: She is alert.          Last Recorded Vitals  There were no vitals taken for this visit.       Assessment/Plan   Principal Problem:    CORTEZ (stress urinary incontinence,  female)      Proceed with scheduled MUS  Informed consent performed in office  Discussed post op resterictions and medications       Dacia Cain MD  URPS Fellow

## 2023-12-28 NOTE — ANESTHESIA POSTPROCEDURE EVALUATION
Patient: Nancy Andres    Procedure Summary       Date: 12/28/23 Room / Location: [facility] OR 04 / Virtual [facility] OR    Anesthesia Start: 0738 Anesthesia Stop: 0915    Procedures:       Suspension Urethra with Retropubic Sling      Cystoscopy Rigid Diagnosis:       CORTEZ (stress urinary incontinence, female)      (CORTEZ (stress urinary incontinence, female) [N39.3])    Surgeons: Leonora Mena MD Responsible Provider: Tony Ruby DO    Anesthesia Type: general ASA Status: 2            Anesthesia Type: general    Vitals Value Taken Time   /72 12/28/23 1015   Temp 36.1 °C (97 °F) 12/28/23 1015   Pulse 70 12/28/23 1017   Resp 16 12/28/23 1017   SpO2 97 % 12/28/23 1017   Vitals shown include unvalidated device data.    Anesthesia Post Evaluation    Patient location during evaluation: PACU  Patient participation: complete - patient participated  Level of consciousness: awake  Pain score: 0  Pain management: adequate  Multimodal analgesia pain management approach  Airway patency: patent  Two or more strategies used to mitigate risk of obstructive sleep apnea  Cardiovascular status: acceptable  Respiratory status: acceptable  Hydration status: acceptable  Postoperative Nausea and Vomiting: none        There were no known notable events for this encounter.

## 2023-12-28 NOTE — ANESTHESIA PREPROCEDURE EVALUATION
Patient: Nancy Andres    Procedure Information       Date/Time: 23 0730    Procedures:       Suspension Urethra with Retropubic Sling      Cystoscopy Rigid    Location: [facility] OR  [facility] OR    Surgeons: Leonora Mena MD          Vitals:    23 0703   BP: 133/86   Pulse: 64   Resp: 18   Temp: 36.4 °C (97.5 °F)   SpO2: 97%       Past Surgical History:   Procedure Laterality Date    IR ANGIOGRAM INFERIOR EPIGASTRIC  2020    IR ANGIOGRAM INFERIOR EPIGASTRIC 2020 [facility]    IR ANGIOGRAM INFERIOR EPIGASTRIC  2020    IR ANGIOGRAM INFERIOR EPIGASTRIC 2020 [facility]    IR ANGIOGRAM INFERIOR EPIGASTRIC PELVIC  2020    IR ANGIOGRAM INFERIOR EPIGASTRIC PELVIC 2020 [facility]    OTHER SURGICAL HISTORY  2021    Uterine nerve ablation    OTHER SURGICAL HISTORY  2021     section    OTHER SURGICAL HISTORY  2021    Corneal lasik     No past medical history on file.    Current Facility-Administered Medications:     acetaminophen (Tylenol) tablet 975 mg, 975 mg, oral, Once, Dacia Cain MD    ceFAZolin in dextrose (iso-os) (Ancef) IVPB 2 g, 2 g, intravenous, Once, Dacia Cain MD    celecoxib (CeleBREX) capsule 200 mg, 200 mg, oral, Once, Dacia Cain MD    lactated Ringer's infusion, 100 mL/hr, intravenous, Continuous, Leonora Mena MD    phenazopyridine (Pyridium) tablet 200 mg, 200 mg, oral, Once, Leonora Mena MD  Prior to Admission medications    Medication Sig Start Date End Date Taking? Authorizing Provider   ascorbic acid (Vitamin C) 250 mg tablet Take 1 tablet (250 mg) by mouth once daily.   Yes Historical Provider, MD   estradiol (Estrace) 0.01 % (0.1 mg/gram) vaginal cream Insert into the vagina. 23   Historical Provider, MD   lactase (Lactaid) 3,000 unit tablet Take 1 tablet (3,000 Units) by mouth.    Historical Provider, MD   oxyCODONE (Roxicodone) 5 mg immediate release tablet Take 1 tablet  (5 mg) by mouth every 6 hours if needed for severe pain (7 - 10) for up to 3 days. 12/28/23 12/31/23  Dacia Cain MD   polyethylene glycol (Glycolax, Miralax) 17 gram/dose powder Take 17 g by mouth once daily. 12/28/23 1/27/24  Dacia Cain MD   Restasis 0.05 % ophthalmic emulsion Administer into affected eye(s). 11/13/20   Historical Provider, MD   valACYclovir (Valtrex) 1 gram tablet Take by mouth twice a day. 3/17/22   Historical Provider, MD     Allergies   Allergen Reactions    Iodinated Contrast Media Shortness of breath    Gadoterate Meglumine Unknown     Social History     Tobacco Use    Smoking status: Never    Smokeless tobacco: Never   Substance Use Topics    Alcohol use: Never         Chemistry    Lab Results   Component Value Date/Time     11/04/2021 1556    K 3.9 11/04/2021 1556     11/04/2021 1556    CO2 27 11/04/2021 1556    BUN 11 11/04/2021 1556    CREATININE 0.57 11/04/2021 1556    Lab Results   Component Value Date/Time    CALCIUM 9.2 11/04/2021 1556    ALKPHOS 41 11/04/2021 1556    AST 15 11/04/2021 1556    ALT 13 11/04/2021 1556    BILITOT 1.2 11/04/2021 1556          Lab Results   Component Value Date/Time    WBC 5.7 11/04/2021 1556    HGB 10.6 (L) 11/04/2021 1556    HCT 38.2 11/04/2021 1556     11/04/2021 1556     Lab Results   Component Value Date/Time    PROTIME 12.5 10/29/2020 1332    INR 1.1 10/29/2020 1332     No results found for this or any previous visit (from the past 4464 hour(s)).   Relevant Problems   Hematology   (+) Iron deficiency anemia       Clinical information reviewed:    Allergies   Problems    OB Status           NPO Detail:  NPO/Void Status  Carbonhydrate Drink Given Prior to Surgery? : N  Date of Last Liquid: 12/28/23  Time of Last Liquid: 0000  Date of Last Solid: 12/27/23  Time of Last Solid: 1800  Last Intake Type: Clear fluids  Time of Last Void: 0630         Physical Exam    Airway  Mallampati: II  TM distance: >3 FB  Neck ROM:  full     Cardiovascular - normal exam  Rhythm: regular  Rate: normal     Dental - normal exam     Pulmonary - normal exam     Abdominal - normal exam  Abdomen: soft             Anesthesia Plan    ASA 2     general     The patient is not a current smoker.  Patient was previously instructed to abstain from smoking on day of procedure.  Patient did not smoke on day of procedure.  Education provided regarding risk of obstructive sleep apnea.  intravenous induction   Postoperative administration of opioids is intended.  Anesthetic plan and risks discussed with patient.  Use of blood products discussed with patient who.    Plan discussed with CRNA.

## 2023-12-28 NOTE — OP NOTE
Date: 2023  OR Location: [facility] OR    Name: Nancy Andres : 1975, Age: 48 y.o., MRN: [id number], Sex: female    Diagnosis  Pre-op Diagnosis     * CORTEZ (stress urinary incontinence, female) [N39.3] Post-op Diagnosis     * CORTEZ (stress urinary incontinence, female) [N39.3]     Procedures  Suspension Urethra with Retropubic Sling  07563 - [place] SLING OPERATION STRESS INCONTINENCE    Cystoscopy Rigid  70315 - [place] CYSTOURETHROSCOPY      Surgeons      * Leonora Mena - Primary    Resident/Fellow/Other Assistant:  Surgeon(s) and Role:  Dacia Cain MD - PGY5    Procedure Summary  Anesthesia: General  ASA: II  Anesthesia Staff: Anesthesiologist: Tony Ruby DO  CRNA: SULEMA Loco-CRNA  Estimated Blood Loss: 100mL  Intra-op Medications:   Medication Name Total Dose   vasopressin (Vasostrict) injection Cannot be calculated   lactated Ringer's infusion 58.33 mL   ceFAZolin in dextrose (iso-os) (Ancef) IVPB 2 g 2 g              Anesthesia Record               Intraprocedure I/O Totals          Intake    LR 1000.00 mL    ceFAZolin in dextrose (iso-os) (Ancef) IVPB 2 g 100.00 mL    Total Intake 1100 mL       Output    Est. Blood Loss 100 mL    Total Output 100 mL       Net    Net Volume 1000 mL          Specimen: No specimens collected     Staff:   Circulator: Bailey Howard RN  Scrub Person: Francoise Carrington    Findings: on cystoscopy bilateral puncture sites at dome hemostatic at end and small residual clot removed; otherwise normal bladder mucosa without foreign body, bilateral ureteral orifices with active efflux visualized      Procedure Details:  The patient was seen in the preoperative area. The site of surgery was properly noted/marked if necessary per policy. The patient has been actively warmed in preoperative area. Preoperative antibiotics have been ordered and given within 1 hours of incision. Venous thrombosis prophylaxis have been ordered including bilateral sequential compression  devices    Preoperative diagnosis:  Stress urinary incontinence    Postoperative diagnosis:  Stress urinary incontinence    Operation:  Midurethral sling  Cystoscopy    Attending surgeon:  Leonora Mena MD    Fellow:  Dacia Cain MD       Narrative:  General anesthetic was administered and the patient was placed in the dorsal lithotomy position in candy cane stirrups and prepped and draped in the normal sterile fashion.       Sling:  Two Allis clamps were placed on the anterior vaginal mucosa at the level of the bladder neck and 1 cm from the urethral meatus, respectively. Vasopressin was infiltrated in each periurethral space. With the bladder empty the Rudy retropubic sling needle with Desara mesh attached was advanced through the endopelvic fascia, the space of Retzius and the abdominal wall to a premarked spot. The same procedure was carried out on the contralateral side. Care was taken to avoid bladder injury and cystoscopy showed the absence of bladder penetration. The cystoscopy demonstrated efflux of urine from both ureteral orifices and otherwise findings as indicated above.The sling mesh was adjusted so that it rested underneath the midportion of the urethra in a tension free manner. The plastic sleeves were carefully removed while the suburethral portion of the sling was stabilized.  The suprapubic incisions were closed with skin glue and the remainder of the anterior vaginal wall was closed with a 3-0 Vicryl stitch.    I was present and scrubbed for the entire procedure and performed all critical aspects of the procedure myself.    Leonora Mena MD      Complications:  None; patient tolerated the procedure well.     Disposition: PACU - hemodynamically stable.  Condition: stable

## 2023-12-30 ENCOUNTER — TELEPHONE (OUTPATIENT)
Dept: OBSTETRICS AND GYNECOLOGY | Facility: CLINIC | Age: 48
End: 2023-12-30
Payer: COMMERCIAL

## 2023-12-30 NOTE — TELEPHONE ENCOUNTER
Call to patient regarding follow up from surgery with Dr Mena on 12/28/2023. Patient states that she is doing OK, but has not had bowel movement yet. She is taking miralax with out relief. I told her to get milk of magnesia and/ or smooth move tea. I told her to increase fluids and be sure to be up and moving. Patient agrees.

## 2024-01-02 ENCOUNTER — TELEPHONE (OUTPATIENT)
Dept: OBSTETRICS AND GYNECOLOGY | Facility: CLINIC | Age: 49
End: 2024-01-02
Payer: COMMERCIAL

## 2024-01-02 NOTE — TELEPHONE ENCOUNTER
Call to patient; doing much better and having regular bowel movements. Using stool softeners.    CT Head non con came back normal with no signs of bleed, mass effect, or infarction. Explained results to patient and that test is very good for ruling out SAH, but that Lumbar puncture would be definitive test. After discussing lumbar puncture with patient, she elected not to have LP performed, and return precautions were explained in detail. Patient is feeling much better and feels ready to go home.

## 2024-01-08 ENCOUNTER — APPOINTMENT (OUTPATIENT)
Dept: OBSTETRICS AND GYNECOLOGY | Facility: CLINIC | Age: 49
End: 2024-01-08
Payer: COMMERCIAL

## 2024-01-09 ENCOUNTER — OFFICE VISIT (OUTPATIENT)
Dept: OBSTETRICS AND GYNECOLOGY | Facility: CLINIC | Age: 49
End: 2024-01-09
Payer: COMMERCIAL

## 2024-01-09 VITALS — DIASTOLIC BLOOD PRESSURE: 66 MMHG | BODY MASS INDEX: 21.48 KG/M2 | SYSTOLIC BLOOD PRESSURE: 128 MMHG | HEIGHT: 60 IN

## 2024-01-09 DIAGNOSIS — Z98.890 POST-OPERATIVE STATE: ICD-10-CM

## 2024-01-09 PROCEDURE — 99024 POSTOP FOLLOW-UP VISIT: CPT | Performed by: OBSTETRICS & GYNECOLOGY

## 2024-01-09 PROCEDURE — 1036F TOBACCO NON-USER: CPT | Performed by: OBSTETRICS & GYNECOLOGY

## 2024-01-09 ASSESSMENT — ENCOUNTER SYMPTOMS
EYES NEGATIVE: 1
NEUROLOGICAL NEGATIVE: 1
CONSTITUTIONAL NEGATIVE: 1
RESPIRATORY NEGATIVE: 1
ENDOCRINE NEGATIVE: 1
CARDIOVASCULAR NEGATIVE: 1
PSYCHIATRIC NEGATIVE: 1
GASTROINTESTINAL NEGATIVE: 1
MUSCULOSKELETAL NEGATIVE: 1

## 2024-01-09 ASSESSMENT — PAIN SCALES - GENERAL: PAINLEVEL: 0-NO PAIN

## 2024-01-10 NOTE — PROGRESS NOTES
Urogynecology  Provider:  Neyda Bond MD  [phone]      ASSESSMENT AND PLAN:   48 year old female presenting in postoperative follow up s/p midurethral sling and cystoscopy on 12/28/2023 for CORTEZ. Comorbidities include: Iron deficiency anemia.     Diagnoses:  #1 Stress urinary incontinence     Plan:   1. CORTEZ, postop  - 12/28/2023 surgery: Midurethral sling and cystoscopy.   - PVR = 2mL by bladder U/S.   - No evidence of CORTEZ by clinical exam and subjectively per the patient.   - Continue pelvic rest and no lifting >10 pounds until she is 6 weeks postoperative. She may slowly liberalize activity (I.e. yoga, stretching, walking) but we advised against core/rigorous/lifting/biking exercises.   - We encouraged her to continue taking stool softeners/MiraLAX daily x3 more weeks to prevent constipation.     All questions were answered.     Follow up in 4 weeks with Dr. Bond for an 8 week postoperative visit.     Scribe Attestation  By signing my name below, I, Jose Martin Vaughn, Vickyibe, attest that this documentation has been prepared under the direction and in the presence of Neyda Bond MD on 01/09/2024 at 8:09 PM.     Problem List Items Addressed This Visit    None  Visit Diagnoses       Post-operative state        Relevant Orders    Measure post void residual (Completed)                I spent a total of 25 minutes in face to face and non face to face time.      Neyda Bond MD      HISTORY OF PRESENT ILLNESS:   48 year old female presenting in postoperative follow up s/p midurethral sling and cystoscopy on 12/28/2023 for CORTEZ.     Record Review:   - 12/28/2023 surgery: Midurethral sling and cystoscopy.      Urinary Symptoms:   - She is overall feeling well since surgery. However, she states she endorses some tenderness with palpating the site of her incisions.   - Denies UUI.   - CORTEZ with coughing and sneezing has resolved.     Bowel Symptoms:  - Denies experiencing constipation and notes that  her stools are formed described as a Selden scale #4 consistency.   - She has been taking stool softeners daily.     Past Medical History:     No past medical history on file.       Past Surgical History:     Past Surgical History:   Procedure Laterality Date    IR ANGIOGRAM INFERIOR EPIGASTRIC  2020    IR ANGIOGRAM INFERIOR EPIGASTRIC 2020 [facility]    IR ANGIOGRAM INFERIOR EPIGASTRIC  2020    IR ANGIOGRAM INFERIOR EPIGASTRIC 2020 [facility]    IR ANGIOGRAM INFERIOR EPIGASTRIC PELVIC  2020    IR ANGIOGRAM INFERIOR EPIGASTRIC PELVIC 2020 [facility]    OTHER SURGICAL HISTORY  2021    Uterine nerve ablation    OTHER SURGICAL HISTORY  2021     section    OTHER SURGICAL HISTORY  2021    Corneal lasik    RETROPUBIC SLING Right 2023    URETHRAL SLING N/A 2023       Medications:     Prior to Admission medications    Medication Sig Start Date End Date Taking? Authorizing Provider   ascorbic acid (Vitamin C) 250 mg tablet Take 1 tablet (250 mg) by mouth once daily.   Yes Historical Provider, MD   estradiol (Estrace) 0.01 % (0.1 mg/gram) vaginal cream Insert into the vagina. 23  Yes Historical Provider, MD   lactase (Lactaid) 3,000 unit tablet Take 1 tablet (3,000 Units) by mouth.   Yes Historical Provider, MD   polyethylene glycol (Glycolax, Miralax) 17 gram/dose powder Take 17 g by mouth once daily. 23 Yes Dacia Cain MD   Restasis 0.05 % ophthalmic emulsion Administer into affected eye(s). 20  Yes Historical Provider, MD   valACYclovir (Valtrex) 1 gram tablet Take by mouth twice a day. 3/17/22  Yes Historical Provider, MD FELDER  Review of Systems   Constitutional: Negative.    HENT: Negative.     Eyes: Negative.    Respiratory: Negative.     Cardiovascular: Negative.    Gastrointestinal: Negative.    Endocrine: Negative.    Genitourinary: Negative.    Musculoskeletal: Negative.    Neurological:  Negative.    Psychiatric/Behavioral: Negative.            PHYSICAL EXAM:    /66   Ht 1.524 m (5')   LMP  (LMP Unknown)   BMI 21.48 kg/m²   No LMP recorded (lmp unknown). Patient is postmenopausal.      Declines chaperone for physical exam.    PVR = 2mL by bladder U/S    Well developed, well nourished, in no apparent distress.   Neurologic/Psychiatric:  Awake, Alert and Oriented times 3.  Affect normal. Normal cranial nerves  Pulm: breathing without effort  Sexual maturity: Navarro stage V  Abd exam: soft, non-tender      GENITAL/URINARY:     External Genitalia:  The patient has normal appearing external genitalia, normal skenes and bartholins glands, and a normal hair distribution.  Her vulva is without lesions, erythema or discharge.  It is non-tender with appropriate sensation.     Urethral Meatus:  Size normal, Location normal, Lesions absent, Prolapse absent.      Urethra:  Fullness absent, Masses absent. Well-healing midurethra, well-healing trochar sling exit sites on the mons pubis bilaterally with mild ecchymosis. Midurethral sling is intact, no mesh erosion visualized or palpated.     Bladder:  Fullness absent, Masses absent, Tenderness absent,      Vagina:  General appearance normal, Estrogen effect normal, Discharge absent, Lesions absent. Normal speculum exam.       Neyda Bond MD

## 2024-02-26 NOTE — PROGRESS NOTES
Urogynecology  Provider:  Neyda Bond MD  [phone]    ASSESSMENT AND PLAN:   48 year old female presenting in postoperative follow up s/p midurethral sling and cystoscopy on 12/28/2023 for CORTEZ.     Diagnoses:  #1 Stress urinary incontinence      Plan:   1. CORTEZ, postop  - 12/28/2023 surgery: Midurethral sling and cystoscopy.   - PVR = 4mL by bladder U/S.   - No evidence of CORTEZ by clinical exam and subjectively per the patient.   - Released from all postoperative weight/activity restrictions and pelvic rest. She may slowly begin to liberalize activity as tolerated.      Follow up in 8 weeks with Dr. Leonroa Mena. After next visit will plan for annual mesh check pelvic exams.     Scribe Attestation  By signing my name below, I, Jose Martin Vaughn, Scribe, attest that this documentation has been prepared under the direction and in the presence of Neyda Bond MD on 02/27/2024 at 11:18 AM.     Doing well. No CORTEZ  Agree with above. I Dr. Bond, personally performed the services described in the documentation which was scribed virtually and confirm it is both complete and accurate.  Neyda Bond MD        Problem List Items Addressed This Visit    None       I spent a total of eConsult Time: 20 minutes in face to face and non face to face time.        Neyda Bond MD      HISTORY OF PRESENT ILLNESS:   48 year old presenting for postoperative follow up s/p midurethral sling for CORTEZ on 12/28/2023 with Dr. Leonora Mena.     Records Review:  - Last visit 1/9/2024  Diagnoses:  #1 Stress urinary incontinence      Plan:   1. CORTEZ, postop  - 12/28/2023 surgery: Midurethral sling and cystoscopy.   - PVR = 2mL by bladder U/S.   - No evidence of CORTEZ by clinical exam and subjectively per the patient.   - Continue pelvic rest and no lifting >10 pounds until she is 6 weeks postoperative. She may slowly liberalize activity (I.e. yoga, stretching, walking) but we advised against core/rigorous/lifting/biking  exercises.   - We encouraged her to continue taking stool softeners/MiraLAX daily x3 more weeks to prevent constipation.      Postoperative Symptoms:  - Denies experiencing CORTEZ with coughing and sneezing.   - No fevers, chills, nausea, or vomiting.   - She is overall satisfied with CORTEZ resolution since her surgery.     OBGYN History:   - LMP prior to bleeding recently (due to menses) was several months ago but states her menses vary in timing and flow consistent with perimenopausal state.        Past Medical History:    No past medical history on file.       Past Surgical History:     Past Surgical History:   Procedure Laterality Date    IR ANGIOGRAM INFERIOR EPIGASTRIC  2020    IR ANGIOGRAM INFERIOR EPIGASTRIC 2020 [facility]    IR ANGIOGRAM INFERIOR EPIGASTRIC  2020    IR ANGIOGRAM INFERIOR EPIGASTRIC 2020 [facility]    IR ANGIOGRAM INFERIOR EPIGASTRIC PELVIC  2020    IR ANGIOGRAM INFERIOR EPIGASTRIC PELVIC 2020 [facility]    OTHER SURGICAL HISTORY  2021    Uterine nerve ablation    OTHER SURGICAL HISTORY  2021     section    OTHER SURGICAL HISTORY  2021    Corneal lasik    RETROPUBIC SLING Right 2023    URETHRAL SLING N/A 2023       Medications:     Prior to Admission medications    Medication Sig Start Date End Date Taking? Authorizing Provider   ascorbic acid (Vitamin C) 250 mg tablet Take 1 tablet (250 mg) by mouth once daily.    Historical Provider, MD   estradiol (Estrace) 0.01 % (0.1 mg/gram) vaginal cream Insert into the vagina. 23   Historical Provider, MD   lactase (Lactaid) 3,000 unit tablet Take 1 tablet (3,000 Units) by mouth.    Historical Provider, MD   Restasis 0.05 % ophthalmic emulsion Administer into affected eye(s). 20   Historical Provider, MD   valACYclovir (Valtrex) 1 gram tablet Take by mouth twice a day. 3/17/22   Historical Provider, MD FELDER  Review of Systems   Constitutional: Negative.  "   HENT: Negative.     Eyes: Negative.    Respiratory: Negative.     Cardiovascular: Negative.    Gastrointestinal: Negative.    Endocrine: Negative.    Genitourinary: Negative.    Musculoskeletal: Negative.    Neurological: Negative.    Psychiatric/Behavioral: Negative.          Blood, Urine   Date Value Ref Range Status   11/04/2021 NEGATIVE NEGATIVE Final     Nitrite, Urine   Date Value Ref Range Status   11/04/2021 NEGATIVE NEGATIVE Final     Urobilinogen, Urine   Date Value Ref Range Status   11/04/2021 <2.0 0.0 - 1.9 mg/dL Final         PHYSICAL EXAM:    LMP  (LMP Unknown)   No LMP recorded (lmp unknown). Patient is postmenopausal.    Declines chaperone for physical exam.      Well developed, well nourished, in no apparent distress.   Neurologic/Psychiatric:  Awake, Alert and Oriented times 3.  Affect normal.     GENITAL/URINARY:     External Genitalia:  The patient has normal appearing external genitalia, normal skenes and bartholins glands, and a normal hair distribution.  Her vulva is without lesions, erythema or discharge.  It is non-tender with appropriate sensation.      Urethral Meatus:  Size normal, Location normal, Lesions absent, Prolapse absent.       Urethra:  Fullness absent, Masses absent. Well-healing midurethra, well-healing trochar sling exit sites on the mons pubis bilaterally. Midurethral sling is intact, no mesh erosion visualized or palpated.      Bladder:  Fullness absent, Masses absent, Tenderness absent,       Vagina:  General appearance normal, Estrogen effect normal, Discharge absent, Lesions absent. Normal speculum exam. Anterior wall is intact, no mesh erosion from the sling visualized or palpated. No foreign body.       Data and DIAGNOSTIC STUDIES REVIEWED   No results found.   No results found for: \"URINECULTURE\", \"UAMICCOMM\"   Lab Results   Component Value Date    GLUCOSE 85 11/04/2021    CALCIUM 9.2 11/04/2021     11/04/2021    K 3.9 11/04/2021    CO2 27 11/04/2021    CL " 102 11/04/2021    BUN 11 11/04/2021    CREATININE 0.57 11/04/2021     Lab Results   Component Value Date    WBC 5.7 11/04/2021    HGB 10.6 (L) 11/04/2021    HCT 38.2 11/04/2021    MCV 69 (L) 11/04/2021     11/04/2021          Neyda Bond MD

## 2024-02-27 ENCOUNTER — OFFICE VISIT (OUTPATIENT)
Dept: OBSTETRICS AND GYNECOLOGY | Facility: CLINIC | Age: 49
End: 2024-02-27
Payer: COMMERCIAL

## 2024-02-27 VITALS
BODY MASS INDEX: 22.19 KG/M2 | WEIGHT: 113 LBS | SYSTOLIC BLOOD PRESSURE: 116 MMHG | DIASTOLIC BLOOD PRESSURE: 70 MMHG | HEIGHT: 60 IN

## 2024-02-27 DIAGNOSIS — Z98.890 POST-OPERATIVE STATE: ICD-10-CM

## 2024-02-27 PROCEDURE — 99024 POSTOP FOLLOW-UP VISIT: CPT | Performed by: OBSTETRICS & GYNECOLOGY

## 2024-02-27 PROCEDURE — 1036F TOBACCO NON-USER: CPT | Performed by: OBSTETRICS & GYNECOLOGY

## 2024-02-27 ASSESSMENT — ENCOUNTER SYMPTOMS
NEUROLOGICAL NEGATIVE: 1
CARDIOVASCULAR NEGATIVE: 1
RESPIRATORY NEGATIVE: 1
GASTROINTESTINAL NEGATIVE: 1
PSYCHIATRIC NEGATIVE: 1
CONSTITUTIONAL NEGATIVE: 1
ENDOCRINE NEGATIVE: 1
MUSCULOSKELETAL NEGATIVE: 1
EYES NEGATIVE: 1

## 2024-04-29 ENCOUNTER — OFFICE VISIT (OUTPATIENT)
Dept: OBSTETRICS AND GYNECOLOGY | Facility: CLINIC | Age: 49
End: 2024-04-29
Payer: COMMERCIAL

## 2024-04-29 VITALS
HEIGHT: 60 IN | SYSTOLIC BLOOD PRESSURE: 128 MMHG | BODY MASS INDEX: 21.99 KG/M2 | WEIGHT: 112 LBS | DIASTOLIC BLOOD PRESSURE: 76 MMHG

## 2024-04-29 DIAGNOSIS — Z98.890 POST-OPERATIVE STATE: Primary | ICD-10-CM

## 2024-04-29 PROCEDURE — 1036F TOBACCO NON-USER: CPT | Performed by: STUDENT IN AN ORGANIZED HEALTH CARE EDUCATION/TRAINING PROGRAM

## 2024-04-29 PROCEDURE — 99213 OFFICE O/P EST LOW 20 MIN: CPT | Performed by: STUDENT IN AN ORGANIZED HEALTH CARE EDUCATION/TRAINING PROGRAM

## 2024-04-29 ASSESSMENT — PAIN SCALES - GENERAL: PAINLEVEL: 0-NO PAIN

## 2024-04-29 NOTE — PROGRESS NOTES
HISTORY OF PRESENT ILLNESS:  Nancy Andres is a 48 y.o. female, who presents in follow up for CORTEZ, post op from sling 12/28/23     During last encounter on 2/27/24 (Ronda post op), reviewed and agreed to the following:  Plan:   1. CORTEZ, postop  - 12/28/2023 surgery: Midurethral sling and cystoscopy.   - PVR = 4mL by bladder U/S.   - No evidence of CORTEZ by clinical exam and subjectively per the patient.   - Released from all postoperative weight/activity restrictions and pelvic rest. She may slowly begin to liberalize activity as tolerated.       Follow up in 8 weeks with Dr. Leonora Mena. After next visit will plan for annual mesh check pelvic exams.     Today she reports   Bladder doing well.            PHYSICAL EXAMINATION:  No LMP recorded (lmp unknown). Patient is postmenopausal.  There is no height or weight on file to calculate BMI.  LMP  (LMP Unknown)     General Appearance: well appearing  Neuro: Alert and oriented   HEENT: mucous membranes moist, neck supple  Resp: No respiratory distress, normal work of breathing  MSK: normal range of motion, gait appropriate    Pelvic:  Chaperone for pelvic exam:   Genitourinary:  normal external genitalia, Bartholin's glands, Beauregard's glands negative,   Urethra normal meatus, non-tender, no periurethral mass  Vaginal mucosa  normal  Atrophy negative    CST negative    POP-Q (in supine position):  No significant prolapse    Rectal: no hemorrhoids, fissures or masses.    PVR (by ultrasound):       IMPRESSION AND PLAN:  Nancy Andres is a 48 y.o. who presents in follow up for CORTEZ, post op from sling, cysto 12/28/23.    CORTEZ  - symptoms resolved post op  - no e/o mesh erosion or exposure  - happy with results of surgery  - discussed can defer annual checks or could have GYN provider do mesh checks at time of regular annual exam    All questions and concerns were answered and addressed.  The patient expressed understanding and agrees with the plan.     RTC as needed after  she touches base with primary GYN    Leonora Mena MD

## 2024-05-09 ENCOUNTER — OFFICE VISIT (OUTPATIENT)
Dept: PRIMARY CARE | Facility: CLINIC | Age: 49
End: 2024-05-09
Payer: COMMERCIAL

## 2024-05-09 VITALS
HEART RATE: 76 BPM | WEIGHT: 113.3 LBS | RESPIRATION RATE: 14 BRPM | BODY MASS INDEX: 21.39 KG/M2 | DIASTOLIC BLOOD PRESSURE: 80 MMHG | HEIGHT: 61 IN | SYSTOLIC BLOOD PRESSURE: 110 MMHG

## 2024-05-09 DIAGNOSIS — Z23 NEED FOR SHINGLES VACCINE: ICD-10-CM

## 2024-05-09 DIAGNOSIS — N95.2 VAGINAL ATROPHY: ICD-10-CM

## 2024-05-09 DIAGNOSIS — Z13.220 SCREENING FOR LIPID DISORDERS: ICD-10-CM

## 2024-05-09 DIAGNOSIS — Z86.19 HISTORY OF COLD SORES: ICD-10-CM

## 2024-05-09 DIAGNOSIS — Z11.4 SCREENING FOR HIV (HUMAN IMMUNODEFICIENCY VIRUS): ICD-10-CM

## 2024-05-09 DIAGNOSIS — Z13.29 SCREENING FOR THYROID DISORDER: ICD-10-CM

## 2024-05-09 DIAGNOSIS — Z11.59 ENCOUNTER FOR HEPATITIS C SCREENING TEST FOR LOW RISK PATIENT: ICD-10-CM

## 2024-05-09 DIAGNOSIS — Z00.00 ROUTINE GENERAL MEDICAL EXAMINATION AT A HEALTH CARE FACILITY: Primary | ICD-10-CM

## 2024-05-09 PROBLEM — N39.3 SUI (STRESS URINARY INCONTINENCE, FEMALE): Status: RESOLVED | Noted: 2023-10-23 | Resolved: 2024-05-09

## 2024-05-09 PROBLEM — N39.3 STRESS INCONTINENCE, FEMALE: Status: RESOLVED | Noted: 2023-10-20 | Resolved: 2024-05-09

## 2024-05-09 PROCEDURE — 99396 PREV VISIT EST AGE 40-64: CPT | Performed by: INTERNAL MEDICINE

## 2024-05-09 PROCEDURE — 90471 IMMUNIZATION ADMIN: CPT | Performed by: INTERNAL MEDICINE

## 2024-05-09 PROCEDURE — 90750 HZV VACC RECOMBINANT IM: CPT | Performed by: INTERNAL MEDICINE

## 2024-05-09 PROCEDURE — 1036F TOBACCO NON-USER: CPT | Performed by: INTERNAL MEDICINE

## 2024-05-09 RX ORDER — VALACYCLOVIR HYDROCHLORIDE 1 G/1
1000 TABLET, FILM COATED ORAL 2 TIMES DAILY PRN
Status: SHIPPED
Start: 2024-05-09

## 2024-05-09 ASSESSMENT — ENCOUNTER SYMPTOMS
EYE REDNESS: 0
RHINORRHEA: 0
CHEST TIGHTNESS: 0
LIGHT-HEADEDNESS: 0
FACIAL ASYMMETRY: 0
NERVOUS/ANXIOUS: 0
SLEEP DISTURBANCE: 0
NECK STIFFNESS: 0
ABDOMINAL DISTENTION: 0
EYE PAIN: 0
TROUBLE SWALLOWING: 0
ARTHRALGIAS: 0
SORE THROAT: 0
ADENOPATHY: 0
POLYPHAGIA: 0
CHILLS: 0
UNEXPECTED WEIGHT CHANGE: 0
NECK PAIN: 0
HEADACHES: 0
RECTAL PAIN: 0
SHORTNESS OF BREATH: 0
FATIGUE: 0
HYPERACTIVE: 0
SEIZURES: 0
BACK PAIN: 0
VOICE CHANGE: 0
DIARRHEA: 0
CHOKING: 0
DYSURIA: 0
WOUND: 0
ANAL BLEEDING: 0
NUMBNESS: 0
FEVER: 0
HALLUCINATIONS: 0
DYSPHORIC MOOD: 0
EYE ITCHING: 0
DIFFICULTY URINATING: 0
AGITATION: 0
EYE DISCHARGE: 0
ABDOMINAL PAIN: 0
DIZZINESS: 0
DECREASED CONCENTRATION: 0
PHOTOPHOBIA: 0
APPETITE CHANGE: 0
JOINT SWELLING: 0
STRIDOR: 0
FREQUENCY: 0
BLOOD IN STOOL: 0
APNEA: 0
PALPITATIONS: 0
WHEEZING: 0
COUGH: 0
SINUS PAIN: 0
CONFUSION: 0
TREMORS: 0
CONSTIPATION: 0
NAUSEA: 0
POLYDIPSIA: 0
BRUISES/BLEEDS EASILY: 0
ACTIVITY CHANGE: 0
DIAPHORESIS: 0
SINUS PRESSURE: 0
COLOR CHANGE: 0
MYALGIAS: 0
SPEECH DIFFICULTY: 0
FLANK PAIN: 0
FACIAL SWELLING: 0
VOMITING: 0
HEMATURIA: 0
WEAKNESS: 0

## 2024-05-09 ASSESSMENT — PATIENT HEALTH QUESTIONNAIRE - PHQ9
1. LITTLE INTEREST OR PLEASURE IN DOING THINGS: NOT AT ALL
2. FEELING DOWN, DEPRESSED OR HOPELESS: NOT AT ALL
SUM OF ALL RESPONSES TO PHQ9 QUESTIONS 1 AND 2: 0

## 2024-05-09 NOTE — PROGRESS NOTES
Subjective   Patient ID: Nancy Andres is a 48 y.o. female who presents for Annual Exam.    HPI  Pt here to reestablish care.  She hasn't been seen in 2021.  Her diet is good and she is exercising regularly.      She sees GYN annually-Dr. Dela Cruz.  She is up to date on Pap.    She has mammogram done in 9/2023 and it was normal.      She did colonoscopy in 2022 and it was normal.  Repeat noted for 10 years.  No GI or bowel issues.      Review of Systems   Constitutional:  Negative for activity change, appetite change, chills, diaphoresis, fatigue, fever and unexpected weight change.   HENT:  Negative for congestion, dental problem, drooling, ear discharge, ear pain, facial swelling, hearing loss, mouth sores, nosebleeds, postnasal drip, rhinorrhea, sinus pressure, sinus pain, sneezing, sore throat, tinnitus, trouble swallowing and voice change.    Eyes:  Negative for photophobia, pain, discharge, redness, itching and visual disturbance (wears glasses).   Respiratory:  Negative for apnea, cough, choking, chest tightness, shortness of breath, wheezing and stridor.    Cardiovascular:  Negative for chest pain, palpitations and leg swelling.   Gastrointestinal:  Negative for abdominal distention, abdominal pain, anal bleeding, blood in stool, constipation, diarrhea, nausea, rectal pain and vomiting.   Endocrine: Negative for cold intolerance, heat intolerance, polydipsia, polyphagia and polyuria.   Genitourinary:  Negative for decreased urine volume, difficulty urinating, dyspareunia, dysuria, enuresis, flank pain, frequency, genital sores, hematuria, menstrual problem, pelvic pain, urgency, vaginal bleeding, vaginal discharge and vaginal pain.   Musculoskeletal:  Negative for arthralgias, back pain, gait problem, joint swelling, myalgias, neck pain and neck stiffness.   Skin:  Negative for color change, pallor, rash and wound.   Allergic/Immunologic: Negative for environmental allergies, food allergies and  "immunocompromised state.   Neurological:  Negative for dizziness, tremors, seizures, syncope, facial asymmetry, speech difficulty, weakness, light-headedness, numbness and headaches.   Hematological:  Negative for adenopathy. Does not bruise/bleed easily.   Psychiatric/Behavioral:  Negative for agitation, behavioral problems, confusion, decreased concentration, dysphoric mood, hallucinations, self-injury, sleep disturbance and suicidal ideas. The patient is not nervous/anxious and is not hyperactive.        Objective   /80 (BP Location: Left arm, Patient Position: Sitting)   Pulse 76   Resp 14   Ht 1.543 m (5' 0.75\")   Wt 51.4 kg (113 lb 4.8 oz)   LMP 04/15/2024   BMI 21.58 kg/m²    Physical Exam  Constitutional:       Appearance: Normal appearance.   HENT:      Head: Normocephalic and atraumatic.      Right Ear: Tympanic membrane, ear canal and external ear normal. There is no impacted cerumen.      Left Ear: Tympanic membrane, ear canal and external ear normal. There is no impacted cerumen.      Nose: Nose normal. No congestion or rhinorrhea.      Mouth/Throat:      Mouth: Mucous membranes are moist.      Pharynx: Oropharynx is clear. No oropharyngeal exudate or posterior oropharyngeal erythema.   Eyes:      Extraocular Movements: Extraocular movements intact.      Conjunctiva/sclera: Conjunctivae normal.      Pupils: Pupils are equal, round, and reactive to light.   Neck:      Vascular: No carotid bruit.   Cardiovascular:      Rate and Rhythm: Normal rate and regular rhythm.      Pulses: Normal pulses.      Heart sounds: Normal heart sounds. No murmur heard.  Pulmonary:      Effort: Pulmonary effort is normal. No respiratory distress.      Breath sounds: Normal breath sounds. No wheezing, rhonchi or rales.   Abdominal:      General: Abdomen is flat. Bowel sounds are normal. There is no distension.      Palpations: Abdomen is soft.      Tenderness: There is no abdominal tenderness.      Hernia: No " hernia is present.   Musculoskeletal:         General: No swelling or tenderness. Normal range of motion.      Cervical back: Normal range of motion and neck supple.      Right lower leg: No edema.      Left lower leg: No edema.   Lymphadenopathy:      Cervical: No cervical adenopathy.   Skin:     General: Skin is warm and dry.      Findings: No lesion or rash.   Neurological:      General: No focal deficit present.      Mental Status: She is alert and oriented to person, place, and time.      Cranial Nerves: No cranial nerve deficit.      Sensory: No sensory deficit.      Motor: No weakness.   Psychiatric:         Mood and Affect: Mood normal.         Behavior: Behavior normal.         Thought Content: Thought content normal.         Judgment: Judgment normal.         Assessment/Plan   Problem List Items Addressed This Visit       History of cold sores    Vaginal atrophy     Other Visit Diagnoses       Routine general medical examination at a health care facility    -  Primary    Relevant Orders    Comprehensive Metabolic Panel    CBC    Urinalysis with Reflex Microscopic    Follow Up In Primary Care - Health Maintenance    Screening for lipid disorders        Relevant Orders    Lipid Panel    Screening for thyroid disorder        Relevant Orders    TSH with reflex to Free T4 if abnormal    Screening for HIV (human immunodeficiency virus)        Relevant Orders    HIV 1/2 Antigen/Antibody Screen with Reflex to Confirmation    Encounter for hepatitis C screening test for low risk patient        Relevant Orders    Hepatitis C Antibody

## 2024-05-09 NOTE — PATIENT INSTRUCTIONS
Get your labs and urine done when able to fast-no food after midnight-can have black coffee/water in AM   You got your first Shingles vaccine today and will need 2nd dose in 2-6 months (may note sore arm, flu like syndrome for 1-2 days)  Continue healthy diet and exercise regularly for general health  Continue to see GYN annually for exam  Continue mammograms yearly   Follow up in 1 year for full physical-sooner if needed

## 2024-05-15 ENCOUNTER — LAB (OUTPATIENT)
Dept: LAB | Facility: LAB | Age: 49
End: 2024-05-15
Payer: COMMERCIAL

## 2024-05-15 DIAGNOSIS — Z13.220 SCREENING FOR LIPID DISORDERS: ICD-10-CM

## 2024-05-15 DIAGNOSIS — Z13.29 SCREENING FOR THYROID DISORDER: ICD-10-CM

## 2024-05-15 DIAGNOSIS — Z00.00 ROUTINE GENERAL MEDICAL EXAMINATION AT A HEALTH CARE FACILITY: ICD-10-CM

## 2024-05-15 DIAGNOSIS — Z11.59 ENCOUNTER FOR HEPATITIS C SCREENING TEST FOR LOW RISK PATIENT: ICD-10-CM

## 2024-05-15 DIAGNOSIS — Z11.4 SCREENING FOR HIV (HUMAN IMMUNODEFICIENCY VIRUS): ICD-10-CM

## 2024-05-15 LAB
ALBUMIN SERPL BCP-MCNC: 4 G/DL (ref 3.4–5)
ALP SERPL-CCNC: 43 U/L (ref 33–110)
ALT SERPL W P-5'-P-CCNC: 12 U/L (ref 7–45)
ANION GAP SERPL CALC-SCNC: 12 MMOL/L (ref 10–20)
APPEARANCE UR: CLEAR
AST SERPL W P-5'-P-CCNC: 11 U/L (ref 9–39)
BILIRUB SERPL-MCNC: 0.7 MG/DL (ref 0–1.2)
BILIRUB UR STRIP.AUTO-MCNC: NEGATIVE MG/DL
BUN SERPL-MCNC: 14 MG/DL (ref 6–23)
CALCIUM SERPL-MCNC: 9.1 MG/DL (ref 8.6–10.6)
CHLORIDE SERPL-SCNC: 105 MMOL/L (ref 98–107)
CHOLEST SERPL-MCNC: 198 MG/DL (ref 0–199)
CHOLESTEROL/HDL RATIO: 3.5
CO2 SERPL-SCNC: 28 MMOL/L (ref 21–32)
COLOR UR: ABNORMAL
CREAT SERPL-MCNC: 0.67 MG/DL (ref 0.5–1.05)
EGFRCR SERPLBLD CKD-EPI 2021: >90 ML/MIN/1.73M*2
ERYTHROCYTE [DISTWIDTH] IN BLOOD BY AUTOMATED COUNT: 12.2 % (ref 11.5–14.5)
GLUCOSE SERPL-MCNC: 99 MG/DL (ref 74–99)
GLUCOSE UR STRIP.AUTO-MCNC: NORMAL MG/DL
HCT VFR BLD AUTO: 43.2 % (ref 36–46)
HCV AB SER QL: NONREACTIVE
HDLC SERPL-MCNC: 56.6 MG/DL
HGB BLD-MCNC: 13.8 G/DL (ref 12–16)
HIV 1+2 AB+HIV1 P24 AG SERPL QL IA: NONREACTIVE
KETONES UR STRIP.AUTO-MCNC: NEGATIVE MG/DL
LDLC SERPL CALC-MCNC: 125 MG/DL
LEUKOCYTE ESTERASE UR QL STRIP.AUTO: ABNORMAL
MCH RBC QN AUTO: 27.9 PG (ref 26–34)
MCHC RBC AUTO-ENTMCNC: 31.9 G/DL (ref 32–36)
MCV RBC AUTO: 87 FL (ref 80–100)
MUCOUS THREADS #/AREA URNS AUTO: NORMAL /LPF
NITRITE UR QL STRIP.AUTO: NEGATIVE
NON HDL CHOLESTEROL: 141 MG/DL (ref 0–149)
NRBC BLD-RTO: 0 /100 WBCS (ref 0–0)
PH UR STRIP.AUTO: 6.5 [PH]
PLATELET # BLD AUTO: 224 X10*3/UL (ref 150–450)
POTASSIUM SERPL-SCNC: 4.1 MMOL/L (ref 3.5–5.3)
PROT SERPL-MCNC: 6.8 G/DL (ref 6.4–8.2)
PROT UR STRIP.AUTO-MCNC: NEGATIVE MG/DL
RBC # BLD AUTO: 4.94 X10*6/UL (ref 4–5.2)
RBC # UR STRIP.AUTO: NEGATIVE /UL
RBC #/AREA URNS AUTO: NORMAL /HPF
SODIUM SERPL-SCNC: 141 MMOL/L (ref 136–145)
SP GR UR STRIP.AUTO: 1.02
SQUAMOUS #/AREA URNS AUTO: NORMAL /HPF
TRIGL SERPL-MCNC: 83 MG/DL (ref 0–149)
TSH SERPL-ACNC: 0.57 MIU/L (ref 0.44–3.98)
UROBILINOGEN UR STRIP.AUTO-MCNC: NORMAL MG/DL
VLDL: 17 MG/DL (ref 0–40)
WBC # BLD AUTO: 4.4 X10*3/UL (ref 4.4–11.3)
WBC #/AREA URNS AUTO: NORMAL /HPF

## 2024-05-15 PROCEDURE — 80053 COMPREHEN METABOLIC PANEL: CPT

## 2024-05-15 PROCEDURE — 80061 LIPID PANEL: CPT

## 2024-05-15 PROCEDURE — 84443 ASSAY THYROID STIM HORMONE: CPT

## 2024-05-15 PROCEDURE — 86803 HEPATITIS C AB TEST: CPT

## 2024-05-15 PROCEDURE — 85027 COMPLETE CBC AUTOMATED: CPT

## 2024-05-15 PROCEDURE — 36415 COLL VENOUS BLD VENIPUNCTURE: CPT

## 2024-05-15 PROCEDURE — 87389 HIV-1 AG W/HIV-1&-2 AB AG IA: CPT

## 2024-05-15 PROCEDURE — 81001 URINALYSIS AUTO W/SCOPE: CPT

## 2024-08-15 ENCOUNTER — APPOINTMENT (OUTPATIENT)
Dept: PRIMARY CARE | Facility: CLINIC | Age: 49
End: 2024-08-15
Payer: COMMERCIAL

## 2024-08-22 ENCOUNTER — APPOINTMENT (OUTPATIENT)
Dept: PRIMARY CARE | Facility: CLINIC | Age: 49
End: 2024-08-22
Payer: COMMERCIAL

## 2024-08-23 ENCOUNTER — APPOINTMENT (OUTPATIENT)
Dept: PRIMARY CARE | Facility: CLINIC | Age: 49
End: 2024-08-23
Payer: COMMERCIAL

## 2024-08-23 PROCEDURE — 90471 IMMUNIZATION ADMIN: CPT | Performed by: INTERNAL MEDICINE

## 2024-08-23 PROCEDURE — 90750 HZV VACC RECOMBINANT IM: CPT | Performed by: INTERNAL MEDICINE

## 2025-05-12 ENCOUNTER — APPOINTMENT (OUTPATIENT)
Dept: PRIMARY CARE | Facility: CLINIC | Age: 50
End: 2025-05-12
Payer: COMMERCIAL

## 2025-05-12 VITALS
DIASTOLIC BLOOD PRESSURE: 72 MMHG | SYSTOLIC BLOOD PRESSURE: 115 MMHG | TEMPERATURE: 98.1 F | HEART RATE: 79 BPM | BODY MASS INDEX: 22.58 KG/M2 | OXYGEN SATURATION: 97 % | RESPIRATION RATE: 14 BRPM | HEIGHT: 60 IN | WEIGHT: 115 LBS

## 2025-05-12 DIAGNOSIS — Z86.19 HISTORY OF COLD SORES: ICD-10-CM

## 2025-05-12 DIAGNOSIS — Z13.29 SCREENING FOR THYROID DISORDER: ICD-10-CM

## 2025-05-12 DIAGNOSIS — E78.00 ELEVATED LDL CHOLESTEROL LEVEL: ICD-10-CM

## 2025-05-12 DIAGNOSIS — N95.1 MENOPAUSAL VASOMOTOR SYNDROME: ICD-10-CM

## 2025-05-12 DIAGNOSIS — N95.2 VAGINAL ATROPHY: ICD-10-CM

## 2025-05-12 DIAGNOSIS — Z00.00 ROUTINE GENERAL MEDICAL EXAMINATION AT A HEALTH CARE FACILITY: Primary | ICD-10-CM

## 2025-05-12 PROBLEM — D50.9 IRON DEFICIENCY ANEMIA: Status: RESOLVED | Noted: 2023-10-20 | Resolved: 2025-05-12

## 2025-05-12 PROCEDURE — 3008F BODY MASS INDEX DOCD: CPT | Performed by: INTERNAL MEDICINE

## 2025-05-12 PROCEDURE — 99396 PREV VISIT EST AGE 40-64: CPT | Performed by: INTERNAL MEDICINE

## 2025-05-12 PROCEDURE — 1036F TOBACCO NON-USER: CPT | Performed by: INTERNAL MEDICINE

## 2025-05-12 ASSESSMENT — ENCOUNTER SYMPTOMS
SINUS PRESSURE: 0
VOMITING: 0
WHEEZING: 0
DIAPHORESIS: 0
CHILLS: 0
ABDOMINAL PAIN: 0
EYE REDNESS: 0
RECTAL PAIN: 0
CONSTIPATION: 0
ACTIVITY CHANGE: 0
FEVER: 0
AGITATION: 0
RHINORRHEA: 0
PHOTOPHOBIA: 0
COUGH: 0
TROUBLE SWALLOWING: 0
BLOOD IN STOOL: 0
SLEEP DISTURBANCE: 0
SHORTNESS OF BREATH: 0
NECK PAIN: 0
DECREASED CONCENTRATION: 0
SEIZURES: 0
POLYPHAGIA: 0
ANAL BLEEDING: 0
WEAKNESS: 0
HEMATURIA: 0
HALLUCINATIONS: 0
CHOKING: 0
UNEXPECTED WEIGHT CHANGE: 0
TREMORS: 0
EYE DISCHARGE: 0
EYE PAIN: 0
HEADACHES: 0
NECK STIFFNESS: 0
DYSPHORIC MOOD: 0
NERVOUS/ANXIOUS: 0
WOUND: 0
COLOR CHANGE: 0
STRIDOR: 0
CONFUSION: 0
ARTHRALGIAS: 0
MYALGIAS: 0
FATIGUE: 0
BACK PAIN: 0
FREQUENCY: 0
SINUS PAIN: 0
NAUSEA: 0
FACIAL SWELLING: 0
PALPITATIONS: 0
BRUISES/BLEEDS EASILY: 0
DYSURIA: 0
APPETITE CHANGE: 0
CHEST TIGHTNESS: 0
POLYDIPSIA: 0
APNEA: 0
SPEECH DIFFICULTY: 0
VOICE CHANGE: 0
DIFFICULTY URINATING: 0
LIGHT-HEADEDNESS: 0
ABDOMINAL DISTENTION: 0
EYE ITCHING: 0
HYPERACTIVE: 0
SORE THROAT: 0
DIARRHEA: 0
DIZZINESS: 0
ADENOPATHY: 0
JOINT SWELLING: 0
NUMBNESS: 0
FLANK PAIN: 0

## 2025-05-12 ASSESSMENT — PROMIS GLOBAL HEALTH SCALE
RATE_MENTAL_HEALTH: EXCELLENT
RATE_GENERAL_HEALTH: VERY GOOD
RATE_PHYSICAL_HEALTH: VERY GOOD
CARRYOUT_SOCIAL_ACTIVITIES: VERY GOOD
RATE_AVERAGE_PAIN: 0
RATE_SOCIAL_SATISFACTION: GOOD
EMOTIONAL_PROBLEMS: RARELY
RATE_QUALITY_OF_LIFE: VERY GOOD
RATE_AVERAGE_FATIGUE: MILD
CARRYOUT_PHYSICAL_ACTIVITIES: COMPLETELY

## 2025-05-12 ASSESSMENT — PATIENT HEALTH QUESTIONNAIRE - PHQ9
2. FEELING DOWN, DEPRESSED OR HOPELESS: NOT AT ALL
1. LITTLE INTEREST OR PLEASURE IN DOING THINGS: NOT AT ALL
SUM OF ALL RESPONSES TO PHQ9 QUESTIONS 1 AND 2: 0

## 2025-05-12 NOTE — PATIENT INSTRUCTIONS
Get fasting blood work done when able-orders given  See GYN come fall and get mammogram order then   Continue healthy diet and exercise regularly   Consider flu/covid boosters come fall  Follow up in 1 year-sooner if needed

## 2025-05-12 NOTE — PROGRESS NOTES
Subjective   Patient ID: Nancy Andres is a 49 y.o. female who presents for Annual Exam.    HPI  Pt here for full physical.  Her weight is stable.  She eats well and exercises regularly.    She sees Dr. Dela Cruz for GYN care.  She believes she went in 2024-fall.  No pelvic or urinary issues.  She does have some hot flashes/night sweats and sleep disturbance with menopause.      She had mammogram in 11/2024 and it was normal.  No breast issues.     She did colonoscopy in 2022 and it was normal.  Repeat in 10 years.          Review of Systems   Constitutional:  Negative for activity change, appetite change, chills, diaphoresis, fatigue, fever and unexpected weight change.   HENT:  Negative for congestion, dental problem, drooling, ear discharge, ear pain, facial swelling, hearing loss, mouth sores, nosebleeds, postnasal drip, rhinorrhea, sinus pressure, sinus pain, sneezing, sore throat, tinnitus, trouble swallowing and voice change.    Eyes:  Positive for visual disturbance (wears corrective lenses-up to date on exam). Negative for photophobia, pain, discharge, redness and itching.   Respiratory:  Negative for apnea, cough, choking, chest tightness, shortness of breath, wheezing and stridor.    Cardiovascular:  Negative for chest pain, palpitations and leg swelling.   Gastrointestinal:  Negative for abdominal distention, abdominal pain, anal bleeding, blood in stool, constipation, diarrhea, nausea, rectal pain and vomiting.   Endocrine: Negative for cold intolerance, heat intolerance, polydipsia, polyphagia and polyuria.   Genitourinary:  Negative for decreased urine volume, difficulty urinating, dyspareunia, dysuria, enuresis, flank pain, frequency, genital sores, hematuria, menstrual problem, pelvic pain, urgency, vaginal bleeding, vaginal discharge and vaginal pain.   Musculoskeletal:  Negative for arthralgias, back pain, gait problem, joint swelling, myalgias, neck pain and neck stiffness.   Skin:  Negative for  color change, pallor, rash and wound.   Allergic/Immunologic: Negative for environmental allergies.   Neurological:  Negative for dizziness, tremors, seizures, syncope, speech difficulty, weakness, light-headedness, numbness and headaches.   Hematological:  Negative for adenopathy. Does not bruise/bleed easily.   Psychiatric/Behavioral:  Negative for agitation, behavioral problems, confusion, decreased concentration, dysphoric mood, hallucinations, self-injury, sleep disturbance and suicidal ideas. The patient is not nervous/anxious and is not hyperactive.        Objective   /72 (BP Location: Right arm, Patient Position: Sitting)   Pulse 79   Temp 36.7 °C (98.1 °F) (Tympanic)   Resp 14   Ht 1.524 m (5')   Wt 52.2 kg (115 lb)   SpO2 97%   BMI 22.46 kg/m²    Physical Exam  Constitutional:       Appearance: Normal appearance.   HENT:      Head: Normocephalic and atraumatic.      Right Ear: Tympanic membrane, ear canal and external ear normal. There is no impacted cerumen.      Left Ear: Tympanic membrane, ear canal and external ear normal. There is no impacted cerumen.      Nose: Nose normal. No congestion or rhinorrhea.      Mouth/Throat:      Mouth: Mucous membranes are moist.      Pharynx: Oropharynx is clear. No oropharyngeal exudate or posterior oropharyngeal erythema.   Eyes:      Extraocular Movements: Extraocular movements intact.      Conjunctiva/sclera: Conjunctivae normal.      Pupils: Pupils are equal, round, and reactive to light.   Neck:      Vascular: No carotid bruit.   Cardiovascular:      Rate and Rhythm: Normal rate and regular rhythm.      Pulses: Normal pulses.      Heart sounds: Normal heart sounds. No murmur heard.  Pulmonary:      Effort: Pulmonary effort is normal. No respiratory distress.      Breath sounds: Normal breath sounds. No wheezing, rhonchi or rales.   Abdominal:      General: Abdomen is flat. Bowel sounds are normal. There is no distension.      Palpations: Abdomen is  soft.      Tenderness: There is no abdominal tenderness.      Hernia: No hernia is present.   Musculoskeletal:         General: No swelling or tenderness. Normal range of motion.      Cervical back: Normal range of motion and neck supple.      Right lower leg: No edema.      Left lower leg: No edema.   Lymphadenopathy:      Cervical: No cervical adenopathy.   Skin:     General: Skin is warm and dry.      Findings: No lesion or rash.   Neurological:      General: No focal deficit present.      Mental Status: She is alert and oriented to person, place, and time.      Cranial Nerves: No cranial nerve deficit.      Sensory: No sensory deficit.      Motor: No weakness.   Psychiatric:         Mood and Affect: Mood normal.         Behavior: Behavior normal.         Thought Content: Thought content normal.         Judgment: Judgment normal.         Assessment/Plan   Problem List Items Addressed This Visit       History of cold sores    Uses valtrex as needed         Vaginal atrophy    Uses topical estrogen cream  Sees GYN           Menopausal vasomotor syndrome    Has some night sweats/hot flashes  She will monitor for now  May talk with GYN regarding this            Other Visit Diagnoses         Routine general medical examination at a health care facility    -  Primary    Relevant Orders    Comprehensive Metabolic Panel    CBC    Follow Up In Primary Care - Health Maintenance      Screening for thyroid disorder        Relevant Orders    TSH with reflex to Free T4 if abnormal      Elevated LDL cholesterol level        Relevant Orders    Lipid Panel

## 2025-05-15 ENCOUNTER — APPOINTMENT (OUTPATIENT)
Dept: PRIMARY CARE | Facility: CLINIC | Age: 50
End: 2025-05-15
Payer: COMMERCIAL

## 2025-06-13 LAB
ALBUMIN SERPL-MCNC: 4.5 G/DL (ref 3.6–5.1)
ALP SERPL-CCNC: 45 U/L (ref 37–153)
ALT SERPL-CCNC: 15 U/L (ref 6–29)
ANION GAP SERPL CALCULATED.4IONS-SCNC: 9 MMOL/L (CALC) (ref 7–17)
AST SERPL-CCNC: 14 U/L (ref 10–35)
BILIRUB SERPL-MCNC: 0.9 MG/DL (ref 0.2–1.2)
BUN SERPL-MCNC: 20 MG/DL (ref 7–25)
CALCIUM SERPL-MCNC: 9.4 MG/DL (ref 8.6–10.4)
CHLORIDE SERPL-SCNC: 104 MMOL/L (ref 98–110)
CHOLEST SERPL-MCNC: 218 MG/DL
CHOLEST/HDLC SERPL: 3.3 (CALC)
CO2 SERPL-SCNC: 28 MMOL/L (ref 20–32)
CREAT SERPL-MCNC: 0.73 MG/DL (ref 0.5–1.03)
EGFRCR SERPLBLD CKD-EPI 2021: 100 ML/MIN/1.73M2
ERYTHROCYTE [DISTWIDTH] IN BLOOD BY AUTOMATED COUNT: 13 % (ref 11–15)
GLUCOSE SERPL-MCNC: 98 MG/DL (ref 65–99)
HCT VFR BLD AUTO: 47.1 % (ref 35–45)
HDLC SERPL-MCNC: 67 MG/DL
HGB BLD-MCNC: 15.1 G/DL (ref 11.7–15.5)
LDLC SERPL CALC-MCNC: 133 MG/DL (CALC)
MCH RBC QN AUTO: 28.8 PG (ref 27–33)
MCHC RBC AUTO-ENTMCNC: 32.1 G/DL (ref 32–36)
MCV RBC AUTO: 89.9 FL (ref 80–100)
NONHDLC SERPL-MCNC: 151 MG/DL (CALC)
PLATELET # BLD AUTO: 243 THOUSAND/UL (ref 140–400)
PMV BLD REES-ECKER: 11.1 FL (ref 7.5–12.5)
POTASSIUM SERPL-SCNC: 4.8 MMOL/L (ref 3.5–5.3)
PROT SERPL-MCNC: 7.3 G/DL (ref 6.1–8.1)
RBC # BLD AUTO: 5.24 MILLION/UL (ref 3.8–5.1)
SODIUM SERPL-SCNC: 141 MMOL/L (ref 135–146)
TRIGL SERPL-MCNC: 83 MG/DL
TSH SERPL-ACNC: 1.1 MIU/L
WBC # BLD AUTO: 5 THOUSAND/UL (ref 3.8–10.8)

## 2025-06-16 ENCOUNTER — TELEPHONE (OUTPATIENT)
Dept: PRIMARY CARE | Facility: CLINIC | Age: 50
End: 2025-06-16
Payer: COMMERCIAL

## 2025-06-16 NOTE — TELEPHONE ENCOUNTER
----- Message from Jennifer Lao sent at 6/15/2025  9:16 AM EDT -----  Cholesterol is a bit high-recommend ct cardiac score to assess; improve diet-lean protein/fish/veggies -healthy fats/oils, less fried/fatty foods. Exercise regularly     ----- Message -----  From: Memo Bukupe Results In  Sent: 6/13/2025  10:37 PM EDT  To: Jennifer Lao V DO

## 2025-06-16 NOTE — TELEPHONE ENCOUNTER
Patient informed on results- she declines Ct cardiac score right now. Patient informed on recommended dietary changes. No other questions or concerns noted right now.

## 2025-07-02 NOTE — TELEPHONE ENCOUNTER
Call to patient; she is now having regular bowel movements without difficulty. Using stool softener. Still feels a pulling sensation; explained that will go away, but for now there is most likely some swelling in surgical area.    Detail Level: Detailed Was A Bandage Applied: Yes Punch Size In Mm: 4 Size Of Lesion In Cm (Optional): 0 Depth Of Punch Biopsy: dermis Biopsy Type: H and E Anesthesia Type: 1% lidocaine with epinephrine Anesthesia Volume In Cc: 1 Hemostasis: None Epidermal Sutures: 4-0 Nylon Wound Care: Petrolatum Dressing: bandage Suture Removal: 14 days Patient Will Remove Sutures At Home?: No Lab: [id number] Lab Facility: [id number] Consent: Written consent was obtained and risks were reviewed including but not limited to scarring, infection, bleeding, scabbing, incomplete removal, nerve damage and allergy to anesthesia. Post-Care Instructions: I reviewed with the patient in detail post-care instructions. Patient is to keep the biopsy site dry overnight, and then apply bacitracin twice daily until healed. Patient may apply hydrogen peroxide soaks to remove any crusting. Home Suture Removal Text: Patient was provided a home suture removal kit and will remove their sutures at home.  If they have any questions or difficulties they will call the office. Notification Instructions: Patient will be notified of biopsy results. However, patient instructed to call the office if not contacted within 2 weeks. Billing Type: Third-Party Bill Information: Selecting Yes will display possible errors in your note based on the variables you have selected. This validation is only offered as a suggestion for you. PLEASE NOTE THAT THE VALIDATION TEXT WILL BE REMOVED WHEN YOU FINALIZE YOUR NOTE. IF YOU WANT TO FAX A PRELIMINARY NOTE YOU WILL NEED TO TOGGLE THIS TO 'NO' IF YOU DO NOT WANT IT IN YOUR FAXED NOTE.

## 2026-05-14 ENCOUNTER — APPOINTMENT (OUTPATIENT)
Dept: PRIMARY CARE | Facility: CLINIC | Age: 51
End: 2026-05-14
Payer: COMMERCIAL

## (undated) DEVICE — DRAPE PACK, LAVH, W/ATTACHED LEGGINGS, W/POUCH, 100 X 114 IN, LF, STERILE

## (undated) DEVICE — PREP TRAY, VAGINAL

## (undated) DEVICE — NEEDLE, HYPODERMIC, SPECIALTY, REGULAR WALL, SHORT BEVEL, 18 G X 1.5 IN

## (undated) DEVICE — SLING, DESARA, BLUE TV

## (undated) DEVICE — Device

## (undated) DEVICE — GLOVE, SURGICAL, PROTEXIS PI , 7.5, PF, LF

## (undated) DEVICE — TIP, SUCTION, YANKAUER, FLEXIBLE

## (undated) DEVICE — GLOVE, SURGICAL, PROTEXIS PI ORTHO, 7.0, PF, LF

## (undated) DEVICE — PACKING, VAGINAL, XRAY DETECTABLE, 2IN X 3YD, STERILE

## (undated) DEVICE — GLOVE, SURGICAL, PROTEXIS PI W/NEU-THERA, 7.5, PF, LF

## (undated) DEVICE — SOLUTION, IRRIGATION, STERILE WATER, 1000 ML, POUR BOTTLE

## (undated) DEVICE — IRRIGATION SET, CYSTOSCOPY, REGULATING CLAMP, STRAIGHT, 81 IN

## (undated) DEVICE — TRAY, SURESTEP, SILICONE DRAINAGE BAG, STATLOCK, 16FR

## (undated) DEVICE — SOLUTION, IRRIGATION, SODIUM CHLORIDE 0.9%, 1000 ML, POUR BOTTLE

## (undated) DEVICE — COUNTER, NEEDLE, FOAM BLOCK, POP-N-COUNT, W/BLADEGUARD, W/ADHESIVE 40 COUNT, RED

## (undated) DEVICE — MARKER, SKIN, DUAL TIP, W/RULER AND LABEL

## (undated) DEVICE — SUTURE, VICRYL, 0, 27 IN, CT-2, UNDYED

## (undated) DEVICE — GLOVE, SURGICAL, PROTEXIS PI ORTHO, 7.5, PF, LF

## (undated) DEVICE — TISSUE ADHESIVE, PREMIERPRO EXOFIN, PRECISION PEN HV, 1.0ML

## (undated) DEVICE — SYRINGE, LUER LOCK, 12ML

## (undated) DEVICE — BRIDGE, CYSTO DBL CHANNEL

## (undated) DEVICE — CONTAINER, SPECIMEN, 120 ML, STERILE

## (undated) DEVICE — SUTURE, VICRYL, 4-0, 18 IN, UNDYED BR PS-2

## (undated) DEVICE — SUTURE, CTD, VICRYL, 2-0, UND, BR, CT-2

## (undated) DEVICE — TOWEL PACK, STERILE, 4/PACK, BLUE

## (undated) DEVICE — SUTURE, VICRYL, 5-0, 27IN, RB-1